# Patient Record
Sex: MALE | Race: WHITE | Employment: FULL TIME | URBAN - NONMETROPOLITAN AREA
[De-identification: names, ages, dates, MRNs, and addresses within clinical notes are randomized per-mention and may not be internally consistent; named-entity substitution may affect disease eponyms.]

---

## 2020-03-19 ENCOUNTER — OFFICE VISIT (OUTPATIENT)
Dept: URGENT CARE | Age: 45
End: 2020-03-19
Payer: COMMERCIAL

## 2020-03-19 VITALS
RESPIRATION RATE: 18 BRPM | OXYGEN SATURATION: 97 % | HEART RATE: 87 BPM | DIASTOLIC BLOOD PRESSURE: 91 MMHG | WEIGHT: 293 LBS | SYSTOLIC BLOOD PRESSURE: 130 MMHG | BODY MASS INDEX: 35.68 KG/M2 | HEIGHT: 76 IN | TEMPERATURE: 99.5 F

## 2020-03-19 LAB
INFLUENZA A ANTIBODY: NEGATIVE
INFLUENZA B ANTIBODY: NEGATIVE
S PYO AG THROAT QL: NORMAL

## 2020-03-19 PROCEDURE — 87804 INFLUENZA ASSAY W/OPTIC: CPT | Performed by: NURSE PRACTITIONER

## 2020-03-19 PROCEDURE — 87880 STREP A ASSAY W/OPTIC: CPT | Performed by: NURSE PRACTITIONER

## 2020-03-19 PROCEDURE — 99202 OFFICE O/P NEW SF 15 MIN: CPT | Performed by: NURSE PRACTITIONER

## 2020-03-19 RX ORDER — AMOXICILLIN 875 MG/1
875 TABLET, COATED ORAL 2 TIMES DAILY
Qty: 20 TABLET | Refills: 0 | Status: SHIPPED | OUTPATIENT
Start: 2020-03-19 | End: 2020-03-29

## 2020-03-19 ASSESSMENT — ENCOUNTER SYMPTOMS
SORE THROAT: 1
COUGH: 0

## 2020-03-19 NOTE — PROGRESS NOTES
[address] [facility]  [address] [address] [address]  [address] [place] [address]  Dept: [phone]  Loc: [phone]    Ashutosh Ren is a 38 y.o. male who presents today for his medical conditions/complaintsas noted below. Ashutosh Ren is c/o of Pharyngitis and Fever (low grade)        HPI:     Pharyngitis   This is a new problem. The current episode started yesterday. The problem has been gradually worsening. Associated symptoms include a fever and a sore throat. Pertinent negatives include no arthralgias, chills, congestion, coughing, headaches or myalgias. History reviewed. No pertinent past medical history. Past Surgical History:   Procedure Laterality Date    KNEE SURGERY Right 1993    PILONIDAL CYST DRAINAGE  1997       History reviewed. No pertinent family history. Social History     Tobacco Use    Smoking status: Former Smoker    Smokeless tobacco: Current User     Types: Snuff   Substance Use Topics    Alcohol use: Not on file      Current Outpatient Medications   Medication Sig Dispense Refill    amoxicillin (AMOXIL) 875 MG tablet Take 1 tablet by mouth 2 times daily for 10 days 20 tablet 0     No current facility-administered medications for this visit. Allergies   Allergen Reactions    Bee Venom     Other      Tree nuts       Health Maintenance   Topic Date Due    HIV screen  12/02/1989    DTaP/Tdap/Td vaccine (1 - Tdap) 12/02/1993    Lipid screen  12/02/2014    Diabetes screen  12/02/2014    Flu vaccine (1) 09/01/2019    Shingles Vaccine (1 of 2) 12/02/2024    Hepatitis A vaccine  Aged Out    Hepatitis B vaccine  Aged Out    Hib vaccine  Aged Out    Meningococcal (ACWY) vaccine  Aged Out    Pneumococcal 0-64 years Vaccine  Aged Out       Subjective:     Review of Systems   Constitutional: Positive for fever. Negative for chills. HENT: Positive for sore throat. Negative for congestion. Respiratory: Negative for cough. Musculoskeletal: Negative for arthralgias and myalgias. Neurological: Negative for headaches. Objective:     Physical Exam  Vitals signs and nursing note reviewed. Constitutional:       Appearance: Normal appearance. He is well-developed. HENT:      Head: Normocephalic and atraumatic. Right Ear: A middle ear effusion is present. Left Ear: A middle ear effusion is present. Mouth/Throat:      Mouth: Mucous membranes are moist.      Pharynx: Uvula midline. Pharyngeal swelling and posterior oropharyngeal erythema present. Tonsils: 1+ on the right. 1+ on the left. Eyes:      General: Lids are normal.      Conjunctiva/sclera: Conjunctivae normal.      Pupils: Pupils are equal, round, and reactive to light. Cardiovascular:      Rate and Rhythm: Normal rate and regular rhythm. Heart sounds: Normal heart sounds. Pulmonary:      Effort: Pulmonary effort is normal.      Breath sounds: Normal breath sounds. Lymphadenopathy:      Cervical: Cervical adenopathy present. Skin:     General: Skin is warm and dry. Neurological:      Mental Status: He is alert and oriented to person, place, and time. Psychiatric:         Speech: Speech normal.         Behavior: Behavior normal.       BP (!) 130/91   Pulse 87   Temp 99.5 °F (37.5 °C)   Resp 18   Ht 6' 4\" (1.93 m)   Wt 293 lb (132.9 kg)   SpO2 97%   BMI 35.67 kg/m²     Assessment:       Diagnosis Orders   1.  Fever, unspecified fever cause  POCT rapid strep A    POCT Influenza A/B   2. Pharyngitis, unspecified etiology         Plan:      Orders Placed This Encounter   Procedures    POCT rapid strep A    POCT Influenza A/B     Results for orders placed or performed in visit on 03/19/20   POCT rapid strep A   Result Value Ref Range    Strep A Ag None Detected None Detected   POCT Influenza A/B   Result Value Ref Range    Influenza A Ab negative     Influenza B Ab negative          Return if symptoms worsen or fail to naproxen (Aleve). Read and follow all instructions on the label. · Be careful when taking over-the-counter cold or flu medicines and Tylenol at the same time. Many of these medicines have acetaminophen, which is Tylenol. Read the labels to make sure that you are not taking more than the recommended dose. Too much acetaminophen (Tylenol) can be harmful. · Drink plenty of fluids. Fluids may help soothe an irritated throat. Hot fluids, such as tea or soup, may help decrease throat pain. · Use over-the-counter throat lozenges to soothe pain. Regular cough drops or hard candy may also help. These should not be given to young children because of the risk of choking. · Do not smoke or allow others to smoke around you. If you need help quitting, talk to your doctor about stop-smoking programs and medicines. These can increase your chances of quitting for good. · Use a vaporizer or humidifier to add moisture to your bedroom. Follow the directions for cleaning the machine. When should you call for help? Call your doctor now or seek immediate medical care if:    · You have new or worse trouble swallowing.     · Your sore throat gets much worse on one side.    Watch closely for changes in your health, and be sure to contact your doctor if you do not get better as expected. Where can you learn more? Go to https://Dropmysitepenelsyeb.Enu[employer]ia [employer]icon[employer]. org and sign in to your iCabbi account. Enter A554 in the Security Innovation box to learn more about \"Sore Throat: Care Instructions. \"     If you do not have an account, please click on the \"Sign Up Now\" link. Current as of: July 28, 2019Content Version: 12.4  © 7520-3766 Healthwise, Incorporated. Care instructions adapted under license by [facility] ([facility]). If you have questions about a medical condition or this instruction, always ask your healthcare professional. [place] [address] any warranty or liability for your use of this information.          Patient Education        Learning About Fever  What is a fever? A fever is a high body temperature. It's one way your body fights being sick. A fever shows that the body is responding to infection or other illnesses, both minor and severe. A fever is a symptom, not an illness by itself. A fever can be a sign that you are ill, but most fevers are not caused by a serious problem. You may have a fever with a minor illness, such as a cold. But sometimes a very serious infection may cause little or no fever. It is important to look at other symptoms, other conditions you have, and how you feel in general. In children, notice how they act and see what symptoms they complain of. What is a normal body temperature? A normal body temperature is about 98. 6ºF. Some people have a normal temperature that is a little higher or a little lower than this. Your temperature may be a little lower in the morning than it is later in the day. It may go up during hot weather or when you exercise, wear heavy clothes, or take a hot bath. Your temperature may also be different depending on how you take it. A temperature taken in the mouth (oral) or under the arm may be a little lower than your core temperature (rectal). What is a fever temperature? A core temperature of 100.4°F or above is considered a fever. What can cause a fever? A fever may be caused by:  · Infections. This is the most common cause of a fever. Examples of infections that can cause a fever include the flu, a kidney infection, or pneumonia. · Some medicines. · Severe trauma or injury, such as a heart attack, stroke, heatstroke, or burns. · Other medical conditions, such as arthritis and some cancers. How can you treat a fever at home? · Ask your doctor if you can take an over-the-counter pain medicine, such as acetaminophen (Tylenol), ibuprofen (Advil, Motrin), or naproxen (Aleve). Be safe with medicines. Read and follow all instructions on the label.   · To prevent

## 2020-03-19 NOTE — PATIENT INSTRUCTIONS
Patient Education        Sore Throat: Care Instructions  Your Care Instructions    Infection by bacteria or a virus causes most sore throats. Cigarette smoke, dry air, air pollution, allergies, and yelling can also cause a sore throat. Sore throats can be painful and annoying. Fortunately, most sore throats go away on their own. If you have a bacterial infection, your doctor may prescribe antibiotics. Follow-up care is a key part of your treatment and safety. Be sure to make and go to all appointments, and call your doctor if you are having problems. It's also a good idea to know your test results and keep a list of the medicines you take. How can you care for yourself at home? · If your doctor prescribed antibiotics, take them as directed. Do not stop taking them just because you feel better. You need to take the full course of antibiotics. · Gargle with warm salt water once an hour to help reduce swelling and relieve discomfort. Use 1 teaspoon of salt mixed in 1 cup of warm water. · Take an over-the-counter pain medicine, such as acetaminophen (Tylenol), ibuprofen (Advil, Motrin), or naproxen (Aleve). Read and follow all instructions on the label. · Be careful when taking over-the-counter cold or flu medicines and Tylenol at the same time. Many of these medicines have acetaminophen, which is Tylenol. Read the labels to make sure that you are not taking more than the recommended dose. Too much acetaminophen (Tylenol) can be harmful. · Drink plenty of fluids. Fluids may help soothe an irritated throat. Hot fluids, such as tea or soup, may help decrease throat pain. · Use over-the-counter throat lozenges to soothe pain. Regular cough drops or hard candy may also help. These should not be given to young children because of the risk of choking. · Do not smoke or allow others to smoke around you. If you need help quitting, talk to your doctor about stop-smoking programs and medicines.  These can increase your this.  Your temperature may be a little lower in the morning than it is later in the day. It may go up during hot weather or when you exercise, wear heavy clothes, or take a hot bath. Your temperature may also be different depending on how you take it. A temperature taken in the mouth (oral) or under the arm may be a little lower than your core temperature (rectal). What is a fever temperature? A core temperature of 100.4°F or above is considered a fever. What can cause a fever? A fever may be caused by:  · Infections. This is the most common cause of a fever. Examples of infections that can cause a fever include the flu, a kidney infection, or pneumonia. · Some medicines. · Severe trauma or injury, such as a heart attack, stroke, heatstroke, or burns. · Other medical conditions, such as arthritis and some cancers. How can you treat a fever at home? · Ask your doctor if you can take an over-the-counter pain medicine, such as acetaminophen (Tylenol), ibuprofen (Advil, Motrin), or naproxen (Aleve). Be safe with medicines. Read and follow all instructions on the label. · To prevent dehydration, drink plenty of fluids. Choose water and other caffeine-free clear liquids until you feel better. If you have kidney, heart, or liver disease and have to limit fluids, talk with your doctor before you increase the amount of fluids you drink. Follow-up care is a key part of your treatment and safety. Be sure to make and go to all appointments, and call your doctor if you are having problems. It's also a good idea to know your test results and keep a list of the medicines you take. Where can you learn more? Go to https://[employer] (PTG)sannaeweb.TransEnergy. org and sign in to your hovelstay account. Enter M325 in the Zimbra box to learn more about \"Learning About Fever. \"     If you do not have an account, please click on the \"Sign Up Now\" link.   Current as of: June 26, 2019Content Version: 12.4  © 7052-6278

## 2020-05-26 ENCOUNTER — OFFICE VISIT (OUTPATIENT)
Age: 45
End: 2020-05-26
Payer: COMMERCIAL

## 2020-05-26 VITALS
TEMPERATURE: 98.3 F | OXYGEN SATURATION: 97 % | BODY MASS INDEX: 37.14 KG/M2 | SYSTOLIC BLOOD PRESSURE: 128 MMHG | HEIGHT: 76 IN | WEIGHT: 305 LBS | HEART RATE: 77 BPM | DIASTOLIC BLOOD PRESSURE: 86 MMHG

## 2020-05-26 PROCEDURE — 99213 OFFICE O/P EST LOW 20 MIN: CPT | Performed by: NURSE PRACTITIONER

## 2020-05-26 RX ORDER — ONDANSETRON 4 MG/1
4 TABLET, ORALLY DISINTEGRATING ORAL 3 TIMES DAILY PRN
Qty: 15 TABLET | Refills: 0 | Status: SHIPPED | OUTPATIENT
Start: 2020-05-26 | End: 2020-05-31

## 2020-05-26 ASSESSMENT — ENCOUNTER SYMPTOMS
ABDOMINAL PAIN: 1
SHORTNESS OF BREATH: 0
DIARRHEA: 1
NAUSEA: 1
VOMITING: 0
SINUS PRESSURE: 0
EYES NEGATIVE: 1
SORE THROAT: 0
COUGH: 0

## 2020-05-26 ASSESSMENT — VISUAL ACUITY: OU: 1

## 2020-05-26 NOTE — PROGRESS NOTES
[address]  [address] [address]  Dept: [phone]  Dept Fax: [phone]  Loc: [phone]    Joey Sanchez is here with complaint of nausea, upset stomach with diarrhea, chills and headache since Sunday night. He has been taking OTC Tylenol for headache but nothing for his stomach. 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 He initially thought it was something he ate but no one in his household has been sick and they all ate the same food over the weekend. He has some chronic diarrhea at times since having his gallbladder out but says this is much different. He denies fever, earache or sore throat. He is not eating at all, but is trying to drink gatorade and is voiding well. HPI:     HPI    History reviewed. No pertinent past medical history. Past Surgical History:   Procedure Laterality Date    KNEE SURGERY Right 1993    PILONIDAL CYST DRAINAGE  1997       History reviewed. No pertinent family history. Social History     Tobacco Use    Smoking status: Former Smoker    Smokeless tobacco: Current User     Types: Snuff   Substance Use Topics    Alcohol use: Not on file      Current Outpatient Medications   Medication Sig Dispense Refill    ondansetron (ZOFRAN-ODT) 4 MG disintegrating tablet Take 1 tablet by mouth 3 times daily as needed for Nausea or Vomiting 15 tablet 0     No current facility-administered medications for this visit.       Allergies   Allergen Reactions    Bee Venom     Other      Tree nuts       Health Maintenance   Topic Date Due    HIV screen  12/02/1989    DTaP/Tdap/Td vaccine (1 - Tdap) 12/02/1993    Lipid screen  12/02/2014    Diabetes screen  12/02/2014    Flu vaccine (Season Ended) 09/01/2020    Hepatitis A vaccine  Aged Out    Hepatitis B vaccine  Aged Out    Hib vaccine  Aged Out    Meningococcal (ACWY) vaccine  Aged Out    Pneumococcal 0-64 sounds are normal.      Palpations: Abdomen is soft. Tenderness: There is abdominal tenderness in the left lower quadrant. There is no right CVA tenderness, left CVA tenderness, guarding or rebound. Musculoskeletal:         General: No tenderness or deformity. Lymphadenopathy:      Head:      Right side of head: No tonsillar adenopathy. Left side of head: No tonsillar adenopathy. Skin:     General: Skin is warm and dry. Capillary Refill: Capillary refill takes less than 2 seconds. Neurological:      General: No focal deficit present. Mental Status: He is alert, oriented to person, place, and time and easily aroused. Mental status is at baseline. Psychiatric:         Attention and Perception: Attention normal.         Mood and Affect: Mood normal.         Speech: Speech normal.         Behavior: Behavior normal. Behavior is cooperative. /86   Pulse 77   Temp 98.3 °F (36.8 °C)   Ht 6' 4\" (1.93 m)   Wt (!) 305 lb (138.3 kg)   SpO2 97%   BMI 37.13 kg/m²     :Assessment       Diagnosis Orders   1. Diarrhea, unspecified type  COVID-19   2. Chills  COVID-19   3. Nonintractable headache, unspecified chronicity pattern, unspecified headache type  COVID-19   4. Nausea  COVID-19       :Plan      Orders Placed This Encounter   Procedures    COVID-19     Gravity     Scheduling Instructions:      1) Due to current limited availability of the COVID-19 test, tests will be prioritized based on responses to questions above. Testing may be delayed due to volume. 2) Print and instruct patient to adhere to [facility] home isolation program. (Link Above)              3) Set up or refer patient for a monitoring program.              4) Have patient sign up for and leverage MyChart (if not previously done).      Plenty of fluids, bland diet  Rest  OTC Tylenol as needed   Zofran as directed for nausea  Work note for 14 days  Follow up with PCP or return to Flu clinic for worsening or unresolved symptoms. Severe abd pain, inability to keep down food or fluids pt is advised to ER    Since pt is being tested for COVID pt has been instructed to quarantine from contacts until testing has been resulted. Further instructions will follow, as of now, this is 14 days unless otherwise specified when results are back. If SOB or worsening sx's develop, need to go to ED or return to clinic, pt voiced understanding. Pt was given printed instructions today on Possible COVID-19 infection with self-quarantine and management of symptoms    Call or return to clinic prn if these symptoms worsen or fail to improve as anticipated. Return if symptoms worsen or fail to improve. Orders Placed This Encounter   Medications    ondansetron (ZOFRAN-ODT) 4 MG disintegrating tablet     Sig: Take 1 tablet by mouth 3 times daily as needed for Nausea or Vomiting     Dispense:  15 tablet     Refill:  0        Patient Instructions   Plenty of fluids, bland diet  Rest  OTC Tylenol as needed   Zofran as directed for nausea  Work note for 14 days  Follow up with PCP or return to Flu clinic for worsening or unresolved symptoms. Severe abd pain, inability to keep down food or fluids pt is advised to ER           Patient given educational materials- see patient instructions. Discussed use, benefit, and side effects of prescribedmedications. All patient questions answered. Pt voiced understanding.        Electronically signed by JULIETH Nielsen CNP on 5/26/2020 at 1:04 PM

## 2020-05-26 NOTE — PATIENT INSTRUCTIONS
Plenty of fluids, bland diet  Rest  OTC Tylenol as needed   Zofran as directed for nausea  Work note for 14 days  Follow up with PCP or return to Flu clinic for worsening or unresolved symptoms.  Severe abd pain, inability to keep down food or fluids pt is advised to ER

## 2020-05-27 LAB
REPORT: NORMAL
SARS-COV-2: NOT DETECTED
THIS TEST SENT TO: NORMAL

## 2020-11-16 ENCOUNTER — TELEPHONE (OUTPATIENT)
Dept: GASTROENTEROLOGY | Age: 45
End: 2020-11-16

## 2020-11-16 RX ORDER — CHLORDIAZEPOXIDE HYDROCHLORIDE AND CLIDINIUM BROMIDE 5; 2.5 MG/1; MG/1
1 CAPSULE ORAL 3 TIMES DAILY PRN
COMMUNITY
End: 2020-11-18 | Stop reason: ALTCHOICE

## 2020-11-16 RX ORDER — ONDANSETRON 4 MG/1
4 TABLET, ORALLY DISINTEGRATING ORAL 2 TIMES DAILY PRN
COMMUNITY
End: 2020-11-18 | Stop reason: ALTCHOICE

## 2020-11-16 NOTE — TELEPHONE ENCOUNTER
11-16-20 Called patient asked if he had any recent procedures that indicated thickening in the throat. Patient stated that maybe like in 2013 he had a EGD & colonoscopy done that showed it. Requested that patient contact that Dr and have them fax over reports. Verbal understanding per patient.  Cv

## 2020-11-18 ENCOUNTER — OFFICE VISIT (OUTPATIENT)
Dept: PRIMARY CARE CLINIC | Age: 45
End: 2020-11-18
Payer: COMMERCIAL

## 2020-11-18 VITALS
TEMPERATURE: 97.9 F | DIASTOLIC BLOOD PRESSURE: 76 MMHG | OXYGEN SATURATION: 98 % | BODY MASS INDEX: 37.26 KG/M2 | HEIGHT: 76 IN | SYSTOLIC BLOOD PRESSURE: 124 MMHG | WEIGHT: 306 LBS | HEART RATE: 86 BPM

## 2020-11-18 DIAGNOSIS — Z76.89 ENCOUNTER TO ESTABLISH CARE: ICD-10-CM

## 2020-11-18 LAB
ALBUMIN SERPL-MCNC: 4.8 G/DL (ref 3.5–5.2)
ALP BLD-CCNC: 87 U/L (ref 40–130)
ALT SERPL-CCNC: 39 U/L (ref 5–41)
ANION GAP SERPL CALCULATED.3IONS-SCNC: 8 MMOL/L (ref 7–19)
AST SERPL-CCNC: 20 U/L (ref 5–40)
BASOPHILS ABSOLUTE: 0.1 K/UL (ref 0–0.2)
BASOPHILS RELATIVE PERCENT: 0.6 % (ref 0–1)
BILIRUB SERPL-MCNC: 0.7 MG/DL (ref 0.2–1.2)
BUN BLDV-MCNC: 15 MG/DL (ref 6–20)
CALCIUM SERPL-MCNC: 9.2 MG/DL (ref 8.6–10)
CHLORIDE BLD-SCNC: 106 MMOL/L (ref 98–111)
CHOLESTEROL, TOTAL: 211 MG/DL (ref 160–199)
CO2: 26 MMOL/L (ref 22–29)
CREAT SERPL-MCNC: 0.9 MG/DL (ref 0.5–1.2)
EOSINOPHILS ABSOLUTE: 0.2 K/UL (ref 0–0.6)
EOSINOPHILS RELATIVE PERCENT: 1.8 % (ref 0–5)
GFR AFRICAN AMERICAN: >59
GFR NON-AFRICAN AMERICAN: >60
GLUCOSE BLD-MCNC: 86 MG/DL (ref 74–109)
HBA1C MFR BLD: 5.5 % (ref 4–6)
HCT VFR BLD CALC: 45.3 % (ref 42–52)
HDLC SERPL-MCNC: 47 MG/DL (ref 55–121)
HEMOGLOBIN: 14.8 G/DL (ref 14–18)
IMMATURE GRANULOCYTES #: 0 K/UL
LDL CHOLESTEROL CALCULATED: 146 MG/DL
LYMPHOCYTES ABSOLUTE: 2.4 K/UL (ref 1.1–4.5)
LYMPHOCYTES RELATIVE PERCENT: 28.1 % (ref 20–40)
MCH RBC QN AUTO: 28.7 PG (ref 27–31)
MCHC RBC AUTO-ENTMCNC: 32.7 G/DL (ref 33–37)
MCV RBC AUTO: 88 FL (ref 80–94)
MONOCYTES ABSOLUTE: 0.5 K/UL (ref 0–0.9)
MONOCYTES RELATIVE PERCENT: 5.5 % (ref 0–10)
NEUTROPHILS ABSOLUTE: 5.4 K/UL (ref 1.5–7.5)
NEUTROPHILS RELATIVE PERCENT: 63.6 % (ref 50–65)
PDW BLD-RTO: 12.9 % (ref 11.5–14.5)
PLATELET # BLD: 231 K/UL (ref 130–400)
PMV BLD AUTO: 9.9 FL (ref 9.4–12.4)
POTASSIUM SERPL-SCNC: 4.4 MMOL/L (ref 3.5–5)
RBC # BLD: 5.15 M/UL (ref 4.7–6.1)
SODIUM BLD-SCNC: 140 MMOL/L (ref 136–145)
TOTAL PROTEIN: 7.4 G/DL (ref 6.6–8.7)
TRIGL SERPL-MCNC: 89 MG/DL (ref 0–149)
TSH SERPL DL<=0.05 MIU/L-ACNC: 0.87 UIU/ML (ref 0.27–4.2)
WBC # BLD: 8.5 K/UL (ref 4.8–10.8)

## 2020-11-18 PROCEDURE — 99214 OFFICE O/P EST MOD 30 MIN: CPT | Performed by: NURSE PRACTITIONER

## 2020-11-18 ASSESSMENT — PATIENT HEALTH QUESTIONNAIRE - PHQ9
SUM OF ALL RESPONSES TO PHQ9 QUESTIONS 1 & 2: 0
SUM OF ALL RESPONSES TO PHQ QUESTIONS 1-9: 0
1. LITTLE INTEREST OR PLEASURE IN DOING THINGS: 0
SUM OF ALL RESPONSES TO PHQ QUESTIONS 1-9: 0
2. FEELING DOWN, DEPRESSED OR HOPELESS: 0
SUM OF ALL RESPONSES TO PHQ QUESTIONS 1-9: 0

## 2020-11-18 ASSESSMENT — ENCOUNTER SYMPTOMS
VOICE CHANGE: 0
SHORTNESS OF BREATH: 0
BACK PAIN: 0
VOMITING: 0
NAUSEA: 0
RHINORRHEA: 0
COUGH: 0
COLOR CHANGE: 0
PHOTOPHOBIA: 0

## 2020-11-18 NOTE — PROGRESS NOTES
[address] [facility]  [address] [address]  [address] [address]  Dept: [phone]  Dept Fax: [phone]  Loc: [phone]    Dina Alba is a 38 y.o. male who presents today for his medical conditions/complaints as noted below. Dina Alba is c/o of Established New Doctor and Referral - General (REF FOR THICKENING IN HIS THROAT; ESOPHAGEAL PAIN; BCBS; AA)        HPI:     HPI   Chief Complaint   Patient presents with   [facility] Established New Doctor    Referral - General     REF FOR THICKENING IN HIS THROAT; ESOPHAGEAL PAIN; BCBS; AA     Patient presents today to establish care. He has history of GERD; he has increasing esophageal pain. He feels \"like there is thickening in his esophagus\". He is scheduled to see GI on 11/23/2020. He needs an actual referral today for this. He is not currently on a PPI. He reports history of EGD which showed \"erosion\" and has also had colonoscopy with polypectomy. He states since having his gallbladder out in 2015 he has had dumping syndrome and increasing GERD. He does not want to take PPI because he does not feel \"acid reflux\". Patient reports snoring; he typically sleeps only 4-5 hours per night because he \"just wakes up\". He has never had sleep study before. He is obese with large neck girth.      Past Medical History:   Diagnosis Date    Esophageal erosions     Gastritis     GERD (gastroesophageal reflux disease)     with Esophagitis    Irritable bowel syndrome with diarrhea       Past Surgical History:   Procedure Laterality Date    CHOLECYSTECTOMY  12/01/2013    COLONOSCOPY  02/11/2016    [facility]    KNEE SURGERY Right 1993    PILONIDAL CYST DRAINAGE  1997    UPPER GASTROINTESTINAL ENDOSCOPY  02/11/2016    [facility]: Erosion w/o bleeding, LA Grade A reflux       Vitals 11/18/2020 5/26/2020 3/19/2020 4/43/4442   SYSTOLIC [phone] [id number]   DIASTOLIC 76 86 91 91   Pulse 86 77 - 87   Temp 97.9 98.3 - 99.5   Resp - - - 18   SpO2 98 97 - 97   Weight 306 lb 305 lb - 293 lb   Height 6' 4\" 6' 4\" - 6' 4\"   Body mass index 37.24 kg/m2 37.12 kg/m2 - 35.66 kg/m2       Family History   Problem Relation Age of Onset    Cancer Mother         unknown    Heart Attack Father        Social History     Tobacco Use    Smoking status: Former Smoker     Packs/day: 1.00     Types: Cigarettes     Last attempt to quit: 2020     Years since quittin.8    Smokeless tobacco: Current User     Types: Snuff   Substance Use Topics    Alcohol use: Yes     Comment: rarely      No current outpatient medications on file. No current facility-administered medications for this visit. Allergies   Allergen Reactions    Bee Venom     Other      Tree nuts       Health Maintenance   Topic Date Due    Pneumococcal 0-64 years Vaccine (1 of 1 - PPSV23) 1980    HIV screen  1989    DTaP/Tdap/Td vaccine (1 - Tdap) 1993    Lipid screen  2014    Diabetes screen  2014    Flu vaccine  Completed    Hepatitis A vaccine  Aged Out    Hepatitis B vaccine  Aged Out    Hib vaccine  Aged Out    Meningococcal (ACWY) vaccine  Aged Out       Subjective:      Review of Systems   Constitutional: Negative for chills and fever. HENT: Negative for ear pain, hearing loss, rhinorrhea and voice change. Eyes: Negative for photophobia and visual disturbance. Respiratory: Negative for cough and shortness of breath. Cardiovascular: Negative for chest pain and palpitations. Gastrointestinal: Negative for nausea and vomiting. Endocrine: Negative. Negative for cold intolerance and heat intolerance. Genitourinary: Negative for difficulty urinating and flank pain. Musculoskeletal: Negative for back pain and neck pain. Skin: Negative for color change and rash. Allergic/Immunologic: Negative for environmental allergies and food allergies.    Neurological: Negative for dizziness, speech difficulty and headaches. Hematological: Does not bruise/bleed easily. Psychiatric/Behavioral: Negative for sleep disturbance and suicidal ideas. Objective:     Physical Exam  Vitals signs and nursing note reviewed. Constitutional:       Appearance: He is well-developed. HENT:      Head: Atraumatic. Right Ear: External ear normal.      Left Ear: External ear normal.      Nose: Nose normal.      Mouth/Throat:     Eyes:      Conjunctiva/sclera: Conjunctivae normal.      Pupils: Pupils are equal, round, and reactive to light. Neck:      Musculoskeletal: Normal range of motion and neck supple. Cardiovascular:      Rate and Rhythm: Normal rate and regular rhythm. Heart sounds: Normal heart sounds, S1 normal and S2 normal.   Pulmonary:      Effort: Pulmonary effort is normal.      Breath sounds: Normal breath sounds. Abdominal:      General: Bowel sounds are normal.      Palpations: Abdomen is soft. Musculoskeletal: Normal range of motion. Skin:     General: Skin is warm and dry. Neurological:      Mental Status: He is alert and oriented to person, place, and time. Psychiatric:         Behavior: Behavior normal.       /76   Pulse 86   Temp 97.9 °F (36.6 °C) (Temporal)   Ht 6' 4\" (1.93 m)   Wt (!) 306 lb (138.8 kg)   SpO2 98%   BMI 37.25 kg/m²     Assessment:       Diagnosis Orders   1. Encounter to establish care  [address], Gastroenterology, [place]    CBC Auto Differential    Comprehensive Metabolic Panel    Lipid Panel    Hemoglobin A1C    TSH without Reflex   2. Gastroesophageal reflux disease without esophagitis  JULIETH Haskins, Gastroenterology, [place]         Plan:     Patient will keep GI appointment as scheduled next week. Discussed that he should consider a sleep study for likely MARLEEN which could be contributing to his symptoms as well. He is agreeable to this but will see GI first. I have placed orders for labs today.  He will follow-up in

## 2020-11-23 ENCOUNTER — OFFICE VISIT (OUTPATIENT)
Dept: GASTROENTEROLOGY | Age: 45
End: 2020-11-23
Payer: COMMERCIAL

## 2020-11-23 ENCOUNTER — TELEPHONE (OUTPATIENT)
Dept: PRIMARY CARE CLINIC | Age: 45
End: 2020-11-23

## 2020-11-23 VITALS
DIASTOLIC BLOOD PRESSURE: 76 MMHG | WEIGHT: 304 LBS | BODY MASS INDEX: 37.02 KG/M2 | OXYGEN SATURATION: 99 % | HEIGHT: 76 IN | HEART RATE: 72 BPM | SYSTOLIC BLOOD PRESSURE: 134 MMHG

## 2020-11-23 PROCEDURE — 99214 OFFICE O/P EST MOD 30 MIN: CPT | Performed by: NURSE PRACTITIONER

## 2020-11-23 RX ORDER — ATORVASTATIN CALCIUM 40 MG/1
40 TABLET, FILM COATED ORAL DAILY
Qty: 90 TABLET | Refills: 2 | Status: SHIPPED | OUTPATIENT
Start: 2020-11-23 | End: 2021-12-28 | Stop reason: SDUPTHER

## 2020-11-23 RX ORDER — OMEPRAZOLE 40 MG/1
40 CAPSULE, DELAYED RELEASE ORAL DAILY
Qty: 30 CAPSULE | Refills: 2 | Status: SHIPPED | OUTPATIENT
Start: 2020-11-23 | End: 2021-05-03

## 2020-11-23 ASSESSMENT — ENCOUNTER SYMPTOMS
VOMITING: 0
CONSTIPATION: 0
ABDOMINAL PAIN: 0
CHOKING: 0
ABDOMINAL DISTENTION: 0
BLOOD IN STOOL: 0
TROUBLE SWALLOWING: 0
RECTAL PAIN: 0
NAUSEA: 0
DIARRHEA: 0
COUGH: 0
SHORTNESS OF BREATH: 0
ANAL BLEEDING: 0

## 2020-11-23 NOTE — PATIENT INSTRUCTIONS
specific directions regarding restrictions to diet and bowel prep instructions including laxatives. Please read these instructions one week prior to your scheduled procedure to ensure that you are prepared. If you have any questions regarding these instructions please call our office Mon through Fri from 8:00 am to 4:00 pm.     Follow prep instructions provided for bowel prep. Take all of the bowel prep as directed. If you are having problems with nausea, stop your prep for 30-45 min to allow the nausea to subside before resuming your prep. It is important to drink plenty of fluids throughout the day before taking your laxatives. This will help to protect your kidneys, prevent dehydration and maximize the effect of the bowel prep. Your diet before a colonoscopy bowel preparation is very important to ensure a successful colon exam. It is recommended to consider certain changes to your diet three to four days prior to the procedure. Remember that your bowels need to be completely empty for the exam.    What foods are good to eat? Cut down on heavy solid foods three to four days before the procedure and start introducing lighter meals to your diet. The following food suggestions are a good part of your diet before a colonoscopy bowel preparation.  Light meat that is easily digestible such as chicken (without the skin)    Potatoes without skin    Cheese    Eggs    A light meal of steamed white fish    Light clear soups    Foods and drinks to avoid  Avoid foods that contain too much fiber. Stay clear of dark colored beverages. They can stick to the walls of the digestive tract and make it difficult to differentiate from blood.  Some of these foods are:   Red meat, rice, nuts and vegetables    Milk, other milk based fluids and cream    Most fruit and puddings    Whole grain pasta    Cereals, bran and seeds    Colored beverages, especially those that are red or purple in color    Red colored Jell-O   On the day before the colonoscopy, continue to drink plenty of clear fluids. It is important   to keep yourself hydrated before the exam.     Please follow all instructions as provided for cleansing the bowel. Failure to have an adequately prepped colon may cause you to have incomplete exam with further testing required.      [email or web address]

## 2020-11-23 NOTE — PROGRESS NOTES
Subjective:     Patient ID: Kym Sims is a 38 y.o. male  PCP: JULIETH Harding  Referring Provider: JULIETH Haskins - NP    HPI  Patient presents to the office today with the following complaints: Gastroesophageal Reflux      GERD  Paitent complains of heartburn/reflux. Symptoms have been present for 6 months. Symptoms occur daily and have worsened. This has been associated with sensation of \"thickening in esophagus/throat. \"  He denies heartburn, hematemesis, melena and midespigastric pain. Medical therapy in the past has included antacids. He is using TUMS prn. Patient denies any lower GI symptoms such as constipation, diarrhea, change in bowel habits, rectal bleeding, and rectal pain. Last EGD 2016 - erosion with bleeding  Last Colonoscopy 2016 - polyps  Denies any family history of colon cancer or colon polyps    This was my first time assessing Mr. Benita Tucker:     1. Globus sensation    2. Gastroesophageal reflux disease, unspecified whether esophagitis present    3. Hx of colonic polyps            Plan:   - Omeprazole 40 mg po daily  - Follow up 6-8 weeks or sooner if needed  - Schedule colonoscopy and endoscopy  Instruct on bowel prep. Nothing to eat or drink after midnight the day of the exam.  Unable to drive for 24 hours after the procedure. No aspirin or nonsteroidal anti-inflammatories for 5 days before procedure. I have discussed the benefits, alternatives, and risks (including bleeding, perforation and death)  for pursuing Endoscopy (EGD/Colonscopy/EUS/ERCP) with the patient and they are willing to continue. We also discussed the need for anesthesia, IV access, proper dietary changes, medication changes if necessary, and need for bowel prep (if ordered) prior to their Endoscopic procedure. They are aware they must have someone accompany them to their scheduled procedure to drive them home - they agree to the above and are willing to continue.        Orders  No orders of the defined types were placed in this encounter. Medications  Orders Placed This Encounter   Medications    omeprazole (PRILOSEC) 40 MG delayed release capsule     Sig: Take 1 capsule by mouth daily Take first thing daily on an empty stomach.      Dispense:  30 capsule     Refill:  2         Patient History:     Past Medical History:   Diagnosis Date    Esophageal erosions     Gastritis     GERD (gastroesophageal reflux disease)     with Esophagitis    Irritable bowel syndrome with diarrhea        Past Surgical History:   Procedure Laterality Date    CHOLECYSTECTOMY  2013    COLONOSCOPY  2016    [facility]  [place]    KNEE SURGERY Right 1993    PILONIDAL CYST DRAINAGE      UPPER GASTROINTESTINAL ENDOSCOPY  2016    [facility]: Erosion w/o bleeding, [place] Grade A reflux       Family History   Problem Relation Age of Onset    Cancer Mother         unknown    Heart Attack Father     Colon Cancer Neg Hx     Esophageal Cancer Neg Hx     Liver Cancer Neg Hx     Rectal Cancer Neg Hx     Stomach Cancer Neg Hx     Liver Disease Neg Hx     Colon Polyps Neg Hx        Social History     Socioeconomic History    Marital status:      Spouse name: None    Number of children: None    Years of education: None    Highest education level: None   Occupational History    None   Social Needs    Financial resource strain: None    Food insecurity     Worry: None     Inability: None    Transportation needs     Medical: None     Non-medical: None   Tobacco Use    Smoking status: Former Smoker     Packs/day: 0.00     Types: Cigarettes     Last attempt to quit: 2000     Years since quittin.9    Smokeless tobacco: Current User     Types: Snuff   Substance and Sexual Activity    Alcohol use: Yes     Comment: rarely    Drug use: Never    Sexual activity: None   Lifestyle    Physical activity     Days per week: None     Minutes per session: None    Stress: None   Relationships    Social connections     Talks on phone: None     Gets together: None     Attends Oriental orthodox service: None     Active member of club or organization: None     Attends meetings of clubs or organizations: None     Relationship status: None    Intimate partner violence     Fear of current or ex partner: None     Emotionally abused: None     Physically abused: None     Forced sexual activity: None   Other Topics Concern    None   Social History Narrative    None       Current Outpatient Medications   Medication Sig Dispense Refill    omeprazole (PRILOSEC) 40 MG delayed release capsule Take 1 capsule by mouth daily Take first thing daily on an empty stomach. 30 capsule 2     No current facility-administered medications for this visit. Allergies   Allergen Reactions    Bee Venom     Other      Tree nuts       Review of Systems   Constitutional: Negative for activity change, appetite change, fatigue, fever and unexpected weight change. HENT: Negative for trouble swallowing. Respiratory: Negative for cough, choking and shortness of breath. Cardiovascular: Negative for chest pain. Gastrointestinal: Negative for abdominal distention, abdominal pain, anal bleeding, blood in stool, constipation, diarrhea, nausea, rectal pain and vomiting. \"thickening in throat\"   Allergic/Immunologic: Negative for food allergies. All other systems reviewed and are negative. Objective:     /76   Pulse 72   Ht 6' 4\" (1.93 m)   Wt (!) 304 lb (137.9 kg)   SpO2 99%   BMI 37.00 kg/m²     Physical Exam  Vitals signs reviewed. Constitutional:       General: He is not in acute distress. Appearance: He is well-developed. HENT:      Head: Normocephalic and atraumatic.       Right Ear: External ear normal.      Left Ear: External ear normal.      Nose: Nose normal.      Comments: Mask on     Mouth/Throat:      Comments: Mask on  Eyes:      Conjunctiva/sclera: Conjunctivae normal.      Pupils: Pupils are equal, round, and reactive to light. Neck:      Musculoskeletal: Normal range of motion and neck supple. Cardiovascular:      Rate and Rhythm: Normal rate and regular rhythm. Heart sounds: Normal heart sounds. No murmur. No friction rub. No gallop. Pulmonary:      Effort: Pulmonary effort is normal. No respiratory distress. Breath sounds: Normal breath sounds. Abdominal:      General: Bowel sounds are normal. There is no distension. Palpations: Abdomen is soft. There is no mass. Tenderness: There is no abdominal tenderness. There is no guarding or rebound. Musculoskeletal: Normal range of motion. Skin:     General: Skin is warm and dry. Findings: No rash. Nails: There is no clubbing. Neurological:      Mental Status: He is alert and oriented to person, place, and time. Gait: Gait normal.   Psychiatric:         Behavior: Behavior normal.         Thought Content:  Thought content normal.

## 2020-11-23 NOTE — TELEPHONE ENCOUNTER
Attempted to call patient with results, no answer. Left message for a call back. Rx for Atorvastatin sent to pharmacy. Lab orders placed.

## 2020-11-23 NOTE — TELEPHONE ENCOUNTER
----- Message from JULIETH Chang sent at 11/18/2020  3:21 PM CST -----  Please inform patient that his bad cholesterol (LDL) is elevated. I would like him to begin atorvastatin 40 mg daily. Repeat labs in 6 months.

## 2020-12-06 ENCOUNTER — OFFICE VISIT (OUTPATIENT)
Age: 45
End: 2020-12-06

## 2020-12-06 VITALS — OXYGEN SATURATION: 97 % | TEMPERATURE: 98.1 F | HEART RATE: 82 BPM

## 2020-12-06 LAB — SARS-COV-2, PCR: NOT DETECTED

## 2020-12-10 ENCOUNTER — ANESTHESIA (OUTPATIENT)
Dept: OPERATING ROOM | Age: 45
End: 2020-12-10

## 2020-12-10 ENCOUNTER — APPOINTMENT (OUTPATIENT)
Dept: OPERATING ROOM | Age: 45
End: 2020-12-10

## 2020-12-10 ENCOUNTER — HOSPITAL ENCOUNTER (OUTPATIENT)
Age: 45
Setting detail: SPECIMEN
Discharge: HOME | End: 2020-12-10
Payer: COMMERCIAL

## 2020-12-10 ENCOUNTER — ANESTHESIA EVENT (OUTPATIENT)
Dept: OPERATING ROOM | Age: 45
End: 2020-12-10

## 2020-12-10 ENCOUNTER — HOSPITAL ENCOUNTER (OUTPATIENT)
Age: 45
Setting detail: OUTPATIENT SURGERY
Discharge: HOME | End: 2020-12-10
Attending: INTERNAL MEDICINE | Admitting: INTERNAL MEDICINE
Payer: COMMERCIAL

## 2020-12-10 VITALS — OXYGEN SATURATION: 97 % | SYSTOLIC BLOOD PRESSURE: 110 MMHG | DIASTOLIC BLOOD PRESSURE: 69 MMHG

## 2020-12-10 VITALS
WEIGHT: 310 LBS | HEIGHT: 76 IN | TEMPERATURE: 98.7 F | SYSTOLIC BLOOD PRESSURE: 120 MMHG | RESPIRATION RATE: 20 BRPM | DIASTOLIC BLOOD PRESSURE: 69 MMHG | BODY MASS INDEX: 37.75 KG/M2 | OXYGEN SATURATION: 98 % | HEART RATE: 74 BPM

## 2020-12-10 PROCEDURE — 45385 COLONOSCOPY W/LESION REMOVAL: CPT

## 2020-12-10 PROCEDURE — 88305 TISSUE EXAM BY PATHOLOGIST: CPT

## 2020-12-10 PROCEDURE — 43239 EGD BIOPSY SINGLE/MULTIPLE: CPT | Performed by: INTERNAL MEDICINE

## 2020-12-10 PROCEDURE — 43239 EGD BIOPSY SINGLE/MULTIPLE: CPT

## 2020-12-10 PROCEDURE — 45385 COLONOSCOPY W/LESION REMOVAL: CPT | Performed by: INTERNAL MEDICINE

## 2020-12-10 PROCEDURE — 88342 IMHCHEM/IMCYTCHM 1ST ANTB: CPT

## 2020-12-10 RX ORDER — LIDOCAINE HYDROCHLORIDE 10 MG/ML
INJECTION, SOLUTION INFILTRATION; PERINEURAL PRN
Status: DISCONTINUED | OUTPATIENT
Start: 2020-12-10 | End: 2020-12-10 | Stop reason: SDUPTHER

## 2020-12-10 RX ORDER — SODIUM CHLORIDE 9 MG/ML
INJECTION, SOLUTION INTRAVENOUS CONTINUOUS
Status: DISCONTINUED | OUTPATIENT
Start: 2020-12-10 | End: 2020-12-10 | Stop reason: HOSPADM

## 2020-12-10 RX ORDER — PROPOFOL 10 MG/ML
INJECTION, EMULSION INTRAVENOUS PRN
Status: DISCONTINUED | OUTPATIENT
Start: 2020-12-10 | End: 2020-12-10 | Stop reason: SDUPTHER

## 2020-12-10 RX ADMIN — LIDOCAINE HYDROCHLORIDE 30 MG: 10 INJECTION, SOLUTION INFILTRATION; PERINEURAL at 11:28

## 2020-12-10 RX ADMIN — PROPOFOL 400 MG: 10 INJECTION, EMULSION INTRAVENOUS at 11:28

## 2020-12-10 RX ADMIN — SODIUM CHLORIDE: 9 INJECTION, SOLUTION INTRAVENOUS at 10:50

## 2020-12-10 NOTE — ANESTHESIA POSTPROCEDURE EVALUATION
Department of Anesthesiology  Postprocedure Note    Patient: Cullen Cowart  MRN: [id number]  YOB: 1975  Date of evaluation: 12/10/2020  Time:  11:27 AM     Procedure Summary     Date:  12/10/20 Room / Location:  [facility] ENDO 01 / [address] [place] [address]    Anesthesia Start:  9599 Anesthesia Stop:      Procedures:       EGD ESOPHAGOGASTRODUODENOSCOPY (N/A Esophagus)      COLORECTAL CANCER SCREENING, NOT HIGH RISK (N/A Abdomen) Diagnosis:  (REFLUX, THICKENING IN THROAT, HX COLON POLYPS)    Surgeon:  Kae Lara MD Responsible Provider: JULIETH Chen CRNA    Anesthesia Type:  general, TIVA ASA Status:  2          Anesthesia Type: general, TIVA    Nas Phase I:      Nas Phase II:      Last vitals: Reviewed and per EMR flowsheets.        Anesthesia Post Evaluation    Patient location during evaluation: bedside  Patient participation: complete - patient participated  Level of consciousness: sleepy but conscious  Airway patency: patent  Nausea & Vomiting: no nausea and no vomiting  Complications: no  Cardiovascular status: blood pressure returned to baseline  Respiratory status: acceptable, room air and spontaneous ventilation  Hydration status: euvolemic

## 2020-12-10 NOTE — H&P
Patient Name: Miguel Barker  : 1975  MRN: [id number]  DATE: 12/10/20    Allergies: Allergies   Allergen Reactions    Bee Venom     Other      Tree nuts        ENDOSCOPY  History and Physical    Procedure:    [] Diagnostic Colonoscopy       [x] Screening Colonoscopy  [x] EGD      [] ERCP      [] EUS       [] Other    [x] Previous office notes/History and Physical reviewed from the patients chart. Please see EMR for further details of HPI. I have examined the patient's status immediately prior to the procedure and:      Indications/HPI:    []Abdominal Pain   []Cancer- GI/Lung     []Fhx of colon CA/polyps  [x]History of Polyps  []Barretts            []Melena  []Abnormal Imaging              []Dysphagia              []Persistent Pneumonia   []Anemia                            []Food Impaction        []History of Polyps  [] GI Bleed             []Pulmonary nodule/Mass   []Change in bowel habits [x]Heartburn/Reflux  []Rectal Bleed (BRBPR)  []Chest Pain - Non Cardiac []Heme (+) Stool []Ulcers  []Constipation  []Hemoptysis  []Varices  []Diarrhea  []Hypoxemia    []Nausea/Vomiting   [x]Screening   []Crohns/Colitis  [x]Other:  Globus Sensation    Anesthesia:   [x] MAC [] Moderate Sedation   [] General   [] None     ROS: 12 pt Review of Symptoms was negative unless mentioned above    Medications:   Prior to Admission medications    Medication Sig Start Date End Date Taking? Authorizing Provider   omeprazole (PRILOSEC) 40 MG delayed release capsule Take 1 capsule by mouth daily Take first thing daily on an empty stomach.  20   JULIETH Arthur - NP   atorvastatin (LIPITOR) 40 MG tablet Take 1 tablet by mouth daily    JULIETH Batres       Past Medical History:  Past Medical History:   Diagnosis Date    Esophageal erosions     Gastritis     GERD (gastroesophageal reflux disease)     with Esophagitis    Irritable bowel syndrome with diarrhea        Past Surgical History:  Past Surgical History:   Procedure Laterality Date    CHOLECYSTECTOMY  2013    COLONOSCOPY  2016    [facility]  [place] [place]    KNEE SURGERY Right 1993    PILONIDAL CYST DRAINAGE      UPPER GASTROINTESTINAL ENDOSCOPY  2016    [facility]: Erosion w/o bleeding, [place] Grade A reflux       Social History:  Social History     Tobacco Use    Smoking status: Former Smoker     Packs/day: 0.00     Types: Cigarettes     Last attempt to quit: 2000     Years since quittin.9    Smokeless tobacco: Current User     Types: Snuff   Substance Use Topics    Alcohol use: Yes     Comment: rarely    Drug use: Never       Vital Signs:   Vitals:    12/10/20 1046   BP: 124/78   Pulse: 83   Resp: 20   Temp: 98.7 °F (37.1 °C)   SpO2: 96%        Physical Exam:  Cardiac:  [x]WNL  []Comments:  Pulmonary:  [x]WNL   []Comments:  Neuro/Mental Status:  [x]WNL  []Comments:  Abdominal:  [x]WNL    []Comments:  Other:   []WNL  []Comments:    Informed Consent:  The risks and benefits of the procedure have been discussed with either the patient or if they cannot consent, their representative. Assessment:  Patient examined and appropriate for planned sedation and procedure. Plan:  Proceed with planned sedation and procedure as above. Margaret Dk, am scribing for and in the presence of Dr. Tate Campo MD.  Electronically signed by Ilene Lincoln RN on 12/10/2020 at 10:49 AM    I personally performed the services described in this documentation as scribed by Ilene Lincoln, and it appears accurate and complete.      Tate Campo MD  12/10/2020

## 2020-12-10 NOTE — OP NOTE
Endoscopic Procedure Note    Patient: Ulises Casey : 1975  Med Rec#: [id number] Acc#: [id number]     Primary Care Provider JULIETH Jackson  Referring Provider: Stephany CLANCY    Endoscopist: Gideon Ferreira MD    Date of Procedure:  12/10/2020    Procedure:   1. EGD with biopsy    Indications:   1. reflux  2. Dyspepsia     Anesthesia:  Sedation was administered by anesthesia who monitored the patient during the procedure. Estimated Blood Loss: minimal    Procedure:   After reviewing the patient's chart and obtaining informed consent, the patient was placed in the left lateral decubitus position. A forward-viewing Olympus endoscope was lubricated and inserted through the mouth into the oropharynx. Under direct visualization, the upper esophagus was intubated. The scope was advanced to the level of the third portion of duodenum. Scope was slowly withdrawn with careful inspection of the mucosal surfaces. The scope was retroflexed for inspection of the gastric fundus and incisura. Findings and maneuvers are listed in impression below. The patient tolerated the procedure well. The scope was removed. There were no immediate complications. Findings:   Esophagus: abnormal: mild esophagitis noted - biopsied   There is no hiatal hernia present. Stomach:  abnormal: mild erythema and mucosal changes suggestive of gastritis noted -  Gastric biopsies were taken from the antrum and body to rule out Helicobacter pylori infection. Duodenum: normal      IMPRESSION:  1. Gastritis/Esophagitis  - biopsied        RECOMMENDATIONS:    1. Await path results, the patient will be contacted in 7-10 days with biopsy results. 2.  Continue PPI twice daily x 3 months, then decrease to once daily  3. If there are findings of Yap's esophagus, then I would repeat EGD in 3 yrs. The results were discussed with the patient and family. A copy of the images obtained were given to the patient.      Sandra Boss MD  12/10/2020  12:07 PM

## 2020-12-10 NOTE — OP NOTE
Patient: Caryle Barns : 1975  [facility] Rec#: [id number] Acc#: [id number]   Primary Care Provider JULIETH Hutson    Date of Procedure:  12/10/2020    Endoscopist: Teresia Osgood, MD    Referring Provider: JULIETH Hutson,     Operation Performed: Colonoscopy with snare polypectomy    Indications: screening/surveillance    Anesthesia:  Sedation was administered by anesthesia who monitored the patient during the procedure. I met with Caryle Barns prior to procedure. We discussed the procedure itself, and I have discussed the risks of endoscopy (including-- but not limited to-- pain, discomfort, bleeding potentially requiring second endoscopic procedure and/or blood transfusion, organ perforation requiring operative repair, damage to organs near the colon, infection, aspiration, cardiopulmonary/allergic reaction), benefits, indications to endoscopy. Additionally, we discussed options other than colonoscopy. The patient expressed understanding. All questions answered. The patient decided to proceed with the procedure. Signed informed consent was placed on the chart. Blood Loss: minimal    Withdrawal time: > 6 minutes  Bowel Prep: adequate     Complications: no immediate complications    DESCRIPTION OF PROCEDURE:     A time out was performed. After written informed consent was obtained, the patient was placed in the left lateral position. The perianal area was inspected, and a digital rectal exam was performed. A rectal exam was performed: normal tone, no palpable lesions. At this point, a forward viewing Olympus colonoscope was inserted into the anus and carefully advanced to the cecum. The cecum was identified by the ileocecal valve and the appendiceal orifice. The colonoscope was then slowly withdrawn with careful inspection of the mucosa in a linear and circumferential fashion. The scope was retroflexed in the rectum.  Suction was utilized during the procedure to remove as much air as possible from the bowel. The colonoscope was removed from the patient, and the procedure was terminated. Findings are listed below. Findings: The mucosa appeared normal throughout the entire examined colon     Two small sessile polyps removed from the right colon with hot snare polypectomy    In the rectum, a small sessile polyp was seen and removed with hot snare polypectomy    Retroflexion in the rectum was normal and revealed no further abnormalities         Recommendations:  1. Repeat colonoscopy: pending pathology - max of 3 yrs given number of polyps removed  2. Await biopsy results-you will receive a letter with your results within 7-10 days    Findings and recommendations were discussed w/ the patient. A copy of the images was provided.     Jose Solis MD  12/10/2020  12:05 PM

## 2020-12-10 NOTE — ANESTHESIA PRE PROCEDURE
Comment: rarely                                Ready to quit: Not Answered  Counseling given: Not Answered      Vital Signs (Current): There were no vitals filed for this visit. BP Readings from Last 3 Encounters:   11/23/20 134/76   11/18/20 124/76   05/26/20 128/86       NPO Status: Time of last liquid consumption: 0500                        Time of last solid consumption: 1900                        Date of last liquid consumption: 12/10/20                        Date of last solid food consumption: 12/08/20    BMI:   Wt Readings from Last 3 Encounters:   11/23/20 (!) 304 lb (137.9 kg)   11/18/20 (!) 306 lb (138.8 kg)   05/26/20 (!) 305 lb (138.3 kg)     There is no height or weight on file to calculate BMI.    CBC:   Lab Results   Component Value Date    WBC 8.5 11/18/2020    RBC 5.15 11/18/2020    HGB 14.8 11/18/2020    HCT 45.3 11/18/2020    MCV 88.0 11/18/2020    RDW 12.9 11/18/2020     11/18/2020       CMP:   Lab Results   Component Value Date     11/18/2020    K 4.4 11/18/2020     11/18/2020    CO2 26 11/18/2020    BUN 15 11/18/2020    CREATININE 0.9 11/18/2020    GFRAA >59 11/18/2020    LABGLOM >60 11/18/2020    GLUCOSE 86 11/18/2020    PROT 7.4 11/18/2020    CALCIUM 9.2 11/18/2020    BILITOT 0.7 11/18/2020    ALKPHOS 87 11/18/2020    AST 20 11/18/2020    ALT 39 11/18/2020       POC Tests: No results for input(s): POCGLU, POCNA, POCK, POCCL, POCBUN, POCHEMO, POCHCT in the last 72 hours.     Coags: No results found for: PROTIME, INR, APTT    HCG (If Applicable): No results found for: PREGTESTUR, PREGSERUM, HCG, HCGQUANT     ABGs: No results found for: PHART, PO2ART, JRT4GWP, ZYB9CVQ, BEART, J7UXWALN     Type & Screen (If Applicable):  No results found for: LABABO, LABRH    Drug/Infectious Status (If Applicable):  No results found for: HIV, HEPCAB    COVID-19 Screening (If Applicable):   Lab Results   Component Value Date    COVID19 Not Detected 12/06/2020         Anesthesia Evaluation  Patient summary reviewed and Nursing notes reviewed  Airway: Mallampati: II  TM distance: >3 FB   Neck ROM: full  Mouth opening: > = 3 FB Dental: normal exam         Pulmonary:Negative Pulmonary ROS and normal exam                               Cardiovascular:Negative CV ROS                      Neuro/Psych:   Negative Neuro/Psych ROS              GI/Hepatic/Renal:   (+) GERD:,           Endo/Other: Negative Endo/Other ROS                    Abdominal:           Vascular: negative vascular ROS. Anesthesia Plan      general and TIVA     ASA 2       Induction: intravenous. Anesthetic plan and risks discussed with patient. Plan discussed with CRNA.                   JULIETH Mak - FARIHA   12/10/2020

## 2020-12-16 ENCOUNTER — OFFICE VISIT (OUTPATIENT)
Dept: PRIMARY CARE CLINIC | Age: 45
End: 2020-12-16
Payer: COMMERCIAL

## 2020-12-16 VITALS
BODY MASS INDEX: 36.53 KG/M2 | HEART RATE: 85 BPM | TEMPERATURE: 97.5 F | SYSTOLIC BLOOD PRESSURE: 136 MMHG | WEIGHT: 300 LBS | HEIGHT: 76 IN | RESPIRATION RATE: 16 BRPM | OXYGEN SATURATION: 98 % | DIASTOLIC BLOOD PRESSURE: 86 MMHG

## 2020-12-16 PROBLEM — K21.9 GASTROESOPHAGEAL REFLUX DISEASE WITHOUT ESOPHAGITIS: Status: ACTIVE | Noted: 2020-12-16

## 2020-12-16 PROCEDURE — 99214 OFFICE O/P EST MOD 30 MIN: CPT | Performed by: NURSE PRACTITIONER

## 2020-12-16 ASSESSMENT — PATIENT HEALTH QUESTIONNAIRE - PHQ9
SUM OF ALL RESPONSES TO PHQ9 QUESTIONS 1 & 2: 0
SUM OF ALL RESPONSES TO PHQ QUESTIONS 1-9: 0
2. FEELING DOWN, DEPRESSED OR HOPELESS: 0
1. LITTLE INTEREST OR PLEASURE IN DOING THINGS: 0
SUM OF ALL RESPONSES TO PHQ QUESTIONS 1-9: 0
SUM OF ALL RESPONSES TO PHQ QUESTIONS 1-9: 0

## 2020-12-16 ASSESSMENT — ENCOUNTER SYMPTOMS
BACK PAIN: 0
COLOR CHANGE: 0
VOMITING: 0
RHINORRHEA: 0
SHORTNESS OF BREATH: 0
PHOTOPHOBIA: 0
NAUSEA: 0
COUGH: 0
VOICE CHANGE: 0

## 2020-12-16 NOTE — PROGRESS NOTES
[address] [facility] CARE  [address] [address] [place] [address]  Dept: [phone]  Dept Fax: [phone]  Loc: [phone]    Nitin Barron is a 54 y.o. male who presents today for his medical conditions/complaints as noted below. Nitin Barron is c/o of Follow-up (doing well, was able to see gastro and had his procedures, has a follow up on 1/18 with them) and Hyperlipidemia (has not started lipitor yet, would like to discuss today)        HPI:     HPI   Chief Complaint   Patient presents with    Follow-up     doing well, was able to see gastro and had his procedures, has a follow up on 1/18 with them    Hyperlipidemia     has not started lipitor yet, would like to discuss today     Patient presents today for follow-up GERD. He has recently had an EGD/colonoscopy with removal of small sessile polyps with benign pathology. He states the omeprazole is working well for him and he is not waking up as often with acid in his mouth. He does continue to snore a lot, have pauses in breathing and wakes feeling unrested. He is agreeable to a sleep study. Recently lipids were elevated and he was to start Lipitor; he has not yet started this. He has history of fatty liver and does have concerns for liver enzymes. Liver enzymes were most recently at normal range. Discussed starting lipitor and then will recheck CMP in 3 months.      Past Medical History:   Diagnosis Date    Esophageal erosions     Gastritis     GERD (gastroesophageal reflux disease)     with Esophagitis    Irritable bowel syndrome with diarrhea       Past Surgical History:   Procedure Laterality Date    CHOLECYSTECTOMY  12/01/2013    COLONOSCOPY  02/11/2016    [facility]-  AP, HP    COLONOSCOPY N/A 12/10/2020    Dr Matthew Mosqueda-AP x 1, HP x 2, 3 yr recall    KNEE SURGERY Right 1993    PILONIDAL CYST DRAINAGE  1997    UPPER GASTROINTESTINAL ENDOSCOPY  02/11/2016    [facility]: Erosion w/o bleeding, LA Grade A reflux    UPPER GASTROINTESTINAL ENDOSCOPY N/A 12/10/2020    Dr DIONISIO Mosqueda-Gastritis/Esophagitis       Vitals 2020 12/10/2020 12/10/2020 12/10/2020 12/10/2020    SYSTOLIC 172 424 596 095 - 892   DIASTOLIC 86 69 68 68 - 69   Pulse 85 74 70 70 - -   Temp 97.5 - - - - -   Resp 16 20 20 20 - -   SpO2 98 98 97 96 97 97   Weight 300 lb - - - - -   Height 6' 4\" - - - - -   Body mass index 36.51 kg/m2 - - - - -   Some recent data might be hidden       Family History   Problem Relation Age of Onset    Cancer Mother         unknown    Heart Attack Father     Colon Cancer Neg Hx     Esophageal Cancer Neg Hx     Liver Cancer Neg Hx     Rectal Cancer Neg Hx     Stomach Cancer Neg Hx     Liver Disease Neg Hx     Colon Polyps Neg Hx        Social History     Tobacco Use    Smoking status: Former Smoker     Packs/day: 0.00     Types: Cigarettes     Quit date: 2000     Years since quittin.9    Smokeless tobacco: Current User     Types: Snuff   Substance Use Topics    Alcohol use: Yes     Comment: rarely      Current Outpatient Medications   Medication Sig Dispense Refill    omeprazole (PRILOSEC) 40 MG delayed release capsule Take 1 capsule by mouth daily Take first thing daily on an empty stomach. 30 capsule 2    atorvastatin (LIPITOR) 40 MG tablet Take 1 tablet by mouth daily (Patient not taking: Reported on 2020) 90 tablet 2     No current facility-administered medications for this visit.       Allergies   Allergen Reactions    Bee Venom     Other      Tree nuts       Health Maintenance   Topic Date Due    HIV screen  1989    DTaP/Tdap/Td vaccine (1 - Tdap) 1993    Lipid screen  2021    Colon cancer screen colonoscopy  12/10/2023    Flu vaccine  Completed    Hepatitis A vaccine  Aged Out    Hepatitis B vaccine  Aged Out    Hib vaccine  Aged Out    Meningococcal (ACWY) vaccine  Aged Out    Pneumococcal 0-64 years Vaccine  Aged Out Subjective:      Review of Systems   Constitutional: Negative for chills and fever. HENT: Negative for ear pain, hearing loss, rhinorrhea and voice change. Eyes: Negative for photophobia and visual disturbance. Respiratory: Negative for cough and shortness of breath. Cardiovascular: Negative for chest pain and palpitations. Gastrointestinal: Negative for nausea and vomiting. Endocrine: Negative. Negative for cold intolerance and heat intolerance. Genitourinary: Negative for difficulty urinating and flank pain. Musculoskeletal: Negative for back pain and neck pain. Skin: Negative for color change and rash. Allergic/Immunologic: Negative for environmental allergies and food allergies. Neurological: Negative for dizziness, speech difficulty and headaches. Hematological: Does not bruise/bleed easily. Psychiatric/Behavioral: Negative for sleep disturbance and suicidal ideas. Objective:     Physical Exam  Constitutional:       Appearance: He is well-developed. HENT:      Head: Atraumatic. Right Ear: External ear normal.      Left Ear: External ear normal.      Nose: Nose normal.   Eyes:      Conjunctiva/sclera: Conjunctivae normal.      Pupils: Pupils are equal, round, and reactive to light. Neck:      Musculoskeletal: Normal range of motion and neck supple. Cardiovascular:      Rate and Rhythm: Normal rate and regular rhythm. Heart sounds: Normal heart sounds, S1 normal and S2 normal.   Pulmonary:      Effort: Pulmonary effort is normal.      Breath sounds: Normal breath sounds. Abdominal:      General: Bowel sounds are normal.      Palpations: Abdomen is soft. Musculoskeletal: Normal range of motion. Skin:     General: Skin is warm and dry. Neurological:      Mental Status: He is alert and oriented to person, place, and time.    Psychiatric:         Behavior: Behavior normal.       /86   Pulse 85   Temp 97.5 °F (36.4 °C) (Temporal)   Resp 16   Ht 6' 4\" (1.93 m)   Wt 300 lb (136.1 kg)   SpO2 98%   BMI 36.52 kg/m²     Assessment:       Diagnosis Orders   1. Obstructive sleep apnea  [facility]   2. Fatty liver  Comprehensive Metabolic Panel   3. Gastroesophageal reflux disease without esophagitis     4. Hyperlipidemia, unspecified hyperlipidemia type  [address]:     He will continue omeprazole. I have placed order for sleep study. Also I would like to recheck liver enzymes in 3 months after starting statin medication. He will follow-up for in office visit in 6 months. Patient given educational materials -see patient instructions. Discussed use, benefit, and side effects of prescribed medications. All patient questions answered. Pt voiced understanding. Reviewed health maintenance. Instructed to continue currentmedications, diet and exercise. Patient agreed with treatment plan. Follow up as directed. MEDICATIONS:  No orders of the defined types were placed in this encounter. ORDERS:  Orders Placed This Encounter   Procedures    Comprehensive Metabolic Panel    [facility]       Follow-up:  Return in about 6 months (around 6/16/2021) for in office. PATIENT INSTRUCTIONS:  Patient Instructions   We are committed to providing you with the best care possible. In order to help us achieve these goals please remember to bring all medications, herbal products, and over the counter supplements with you to each visit. If your provider has ordered testing for you, please be sure to follow up with our office if you have not received results within 7 days after the testing took place. *If you receive a survey after visiting one of our offices, please take time to share your experience concerning your physician office visit. These surveys are confidential and no health information about you is shared.   We are eager to improve for you and we are counting on your feedback to help make that happen. Electronically signed by JUILETH Fofana on 12/16/2020 at 1:12 PM    EMR Dragon/transcription disclaimer:  Much of thisencounter note is electronic transcription/translation of spoken language to printed texts. The electronic translation of spoken language may be erroneous, or at times, nonsensical words or phrases may be inadvertentlytranscribed.   Although I have reviewed the note for such errors, some may still exist.

## 2021-01-11 ENCOUNTER — HOSPITAL ENCOUNTER (OUTPATIENT)
Dept: SLEEP CENTER | Age: 46
Discharge: HOME | End: 2021-01-13
Payer: COMMERCIAL

## 2021-01-11 PROCEDURE — G0399 HOME SLEEP TEST/TYPE 3 PORTA: HCPCS

## 2021-03-04 DIAGNOSIS — G47.33 SLEEP APNEA, OBSTRUCTIVE: Primary | ICD-10-CM

## 2021-03-04 PROCEDURE — 95806 SLEEP STUDY UNATT&RESP EFFT: CPT | Performed by: PSYCHIATRY & NEUROLOGY

## 2021-03-04 NOTE — PROGRESS NOTES
[address] [address]  [place], [address]  Phone [phone] Fax [phone]     Patient Name: Reji Howell 2021  : 1975  Age: 54 y.o.   Patient Address: [facility] [address] [place] [address]       Patient Phone: [phone] (home)     REFERRAL  Referred to: DME provider of patient's choice  Reji Howell is referred for the following:    DME Equipment HPCPS Code Setting   Auto Adjusting CPAP device with flex or comparable pressure relief per comfort  8cm to 16cm   Heated Humidifier  Patient Choice       Replinishible PAP Supplies, 1 year supply  Item HPCPS Code Frequency   Mask of choice  or  1 per 3 months   Nasal Mask cushion/pillows  or  2 per 30 days   Full Face Mask Interface  1 per 30 days   Headgear  1 per 6 months   Tubing, length of choice  or  1 per 3 months   Water Chamber  1 per 6 months   Chinstrap  1 per 6 months   Disposable Filters  2 per 30 days   Reusable Filters  1 per 6 months     Diagnoses:  Obstructive sleep apnea (G47.33)  Length of Need: Lifetime, 99    Ordering Provider: DAYA Mendoza: [id number]         Signature:       Date: 2021      Electronically Signed by Flaco Becker M.D.

## 2021-03-11 ENCOUNTER — TELEPHONE (OUTPATIENT)
Dept: NEUROLOGY | Age: 46
End: 2021-03-11

## 2021-05-03 RX ORDER — OMEPRAZOLE 40 MG/1
CAPSULE, DELAYED RELEASE ORAL
Qty: 30 CAPSULE | Refills: 11 | Status: SHIPPED | OUTPATIENT
Start: 2021-05-03 | End: 2021-12-28 | Stop reason: SDUPTHER

## 2021-06-01 ENCOUNTER — TELEPHONE (OUTPATIENT)
Dept: NEUROLOGY | Age: 46
End: 2021-06-01

## 2021-06-01 NOTE — TELEPHONE ENCOUNTER
Called the patient to see if they would like to reschedule the no show appointment with Dr. Neisha De La Vega, could not reach the patient. I left a voicemail to call [phone] if they would like to reschedule.

## 2021-06-28 ENCOUNTER — OFFICE VISIT (OUTPATIENT)
Dept: PRIMARY CARE CLINIC | Age: 46
End: 2021-06-28
Payer: COMMERCIAL

## 2021-06-28 VITALS
HEART RATE: 83 BPM | HEIGHT: 76 IN | TEMPERATURE: 98 F | WEIGHT: 298.8 LBS | BODY MASS INDEX: 36.38 KG/M2 | RESPIRATION RATE: 14 BRPM | DIASTOLIC BLOOD PRESSURE: 84 MMHG | SYSTOLIC BLOOD PRESSURE: 132 MMHG | OXYGEN SATURATION: 97 %

## 2021-06-28 DIAGNOSIS — E78.2 MIXED HYPERLIPIDEMIA: ICD-10-CM

## 2021-06-28 DIAGNOSIS — Z00.00 ANNUAL PHYSICAL EXAM: ICD-10-CM

## 2021-06-28 DIAGNOSIS — Z00.00 ANNUAL PHYSICAL EXAM: Primary | ICD-10-CM

## 2021-06-28 DIAGNOSIS — E78.00 HYPERCHOLESTEROLEMIA: ICD-10-CM

## 2021-06-28 DIAGNOSIS — G47.33 OBSTRUCTIVE SLEEP APNEA: ICD-10-CM

## 2021-06-28 LAB
ALBUMIN SERPL-MCNC: 4.5 G/DL (ref 3.5–5.2)
ALP BLD-CCNC: 112 U/L (ref 40–130)
ALT SERPL-CCNC: 37 U/L (ref 5–41)
ANION GAP SERPL CALCULATED.3IONS-SCNC: 10 MMOL/L (ref 7–19)
AST SERPL-CCNC: 22 U/L (ref 5–40)
BASOPHILS ABSOLUTE: 0 K/UL (ref 0–0.2)
BASOPHILS RELATIVE PERCENT: 0.5 % (ref 0–1)
BILIRUB SERPL-MCNC: 1 MG/DL (ref 0.2–1.2)
BUN BLDV-MCNC: 15 MG/DL (ref 6–20)
CALCIUM SERPL-MCNC: 9.1 MG/DL (ref 8.6–10)
CHLORIDE BLD-SCNC: 107 MMOL/L (ref 98–111)
CHOLESTEROL, TOTAL: 150 MG/DL (ref 160–199)
CO2: 27 MMOL/L (ref 22–29)
CREAT SERPL-MCNC: 1 MG/DL (ref 0.5–1.2)
EOSINOPHILS ABSOLUTE: 0.2 K/UL (ref 0–0.6)
EOSINOPHILS RELATIVE PERCENT: 2.1 % (ref 0–5)
GFR AFRICAN AMERICAN: >59
GFR NON-AFRICAN AMERICAN: >60
GLUCOSE BLD-MCNC: 123 MG/DL (ref 74–109)
HBA1C MFR BLD: 5.2 % (ref 4–6)
HCT VFR BLD CALC: 42.6 % (ref 42–52)
HDLC SERPL-MCNC: 40 MG/DL (ref 55–121)
HEMOGLOBIN: 14.2 G/DL (ref 14–18)
IMMATURE GRANULOCYTES #: 0 K/UL
LDL CHOLESTEROL CALCULATED: 94 MG/DL
LYMPHOCYTES ABSOLUTE: 1.8 K/UL (ref 1.1–4.5)
LYMPHOCYTES RELATIVE PERCENT: 22.2 % (ref 20–40)
MCH RBC QN AUTO: 28.7 PG (ref 27–31)
MCHC RBC AUTO-ENTMCNC: 33.3 G/DL (ref 33–37)
MCV RBC AUTO: 86.1 FL (ref 80–94)
MONOCYTES ABSOLUTE: 0.5 K/UL (ref 0–0.9)
MONOCYTES RELATIVE PERCENT: 5.7 % (ref 0–10)
NEUTROPHILS ABSOLUTE: 5.7 K/UL (ref 1.5–7.5)
NEUTROPHILS RELATIVE PERCENT: 69.1 % (ref 50–65)
PDW BLD-RTO: 12.9 % (ref 11.5–14.5)
PLATELET # BLD: 210 K/UL (ref 130–400)
PMV BLD AUTO: 10.4 FL (ref 9.4–12.4)
POTASSIUM SERPL-SCNC: 3.9 MMOL/L (ref 3.5–5)
RBC # BLD: 4.95 M/UL (ref 4.7–6.1)
SODIUM BLD-SCNC: 144 MMOL/L (ref 136–145)
TOTAL PROTEIN: 7.2 G/DL (ref 6.6–8.7)
TRIGL SERPL-MCNC: 79 MG/DL (ref 0–149)
TSH SERPL DL<=0.05 MIU/L-ACNC: 1.18 UIU/ML (ref 0.27–4.2)
WBC # BLD: 8.2 K/UL (ref 4.8–10.8)

## 2021-06-28 PROCEDURE — 99396 PREV VISIT EST AGE 40-64: CPT | Performed by: NURSE PRACTITIONER

## 2021-06-28 ASSESSMENT — ENCOUNTER SYMPTOMS
RHINORRHEA: 0
BACK PAIN: 0
VOICE CHANGE: 0
COUGH: 0
PHOTOPHOBIA: 0
SHORTNESS OF BREATH: 0
VOMITING: 0
NAUSEA: 0
COLOR CHANGE: 0

## 2021-06-28 ASSESSMENT — PATIENT HEALTH QUESTIONNAIRE - PHQ9
2. FEELING DOWN, DEPRESSED OR HOPELESS: 0
SUM OF ALL RESPONSES TO PHQ QUESTIONS 1-9: 0
SUM OF ALL RESPONSES TO PHQ9 QUESTIONS 1 & 2: 0
SUM OF ALL RESPONSES TO PHQ QUESTIONS 1-9: 0
1. LITTLE INTEREST OR PLEASURE IN DOING THINGS: 0
SUM OF ALL RESPONSES TO PHQ QUESTIONS 1-9: 0

## 2021-06-28 NOTE — PROGRESS NOTES
[address] [facility] CARE  [address] [id number] [address] [address]  Dept: [phone]  Dept Fax: [phone]  Loc: [phone]    Leroy Hooker is a 54 y.o. male who presents today for his medical conditions/complaints as noted below. Leroy Hooker is c/o of Hyperlipidemia (6 month follow up; doing well; on an antibiotic from dermatology, but unsure of the name) and Other (has not had labs yet, but will go down after his appointment)      HPI:     HPI   Chief Complaint   Patient presents with    Hyperlipidemia     6 month follow up; doing well; on an antibiotic from dermatology, but unsure of the name    Other     has not had labs yet, but will go down after his appointment     Patient presents today for physical and follow-up hyperlipidemia and GERD. He is due for labs today. He is currently on doxycycline for acne vulgaris; prescribed per dermatology. Patient was set up on cpap earlier this year for MARLEEN. He reports feeling substantially better since starting on cpap. He reports less joint pain; no headaches; having more energy. He is compliant with this nightly.      Past Medical History:   Diagnosis Date    Esophageal erosions     Gastritis     GERD (gastroesophageal reflux disease)     with Esophagitis    Irritable bowel syndrome with diarrhea       Past Surgical History:   Procedure Laterality Date    CHOLECYSTECTOMY  12/01/2013    COLONOSCOPY  02/11/2016    [facility]-  AP, HP    COLONOSCOPY N/A 12/10/2020    Dr Andreia Mosqueda-AP x 1, HP x 2, 3 yr recall    KNEE SURGERY Right 1993    PILONIDAL CYST DRAINAGE  1997    UPPER GASTROINTESTINAL ENDOSCOPY  02/11/2016    Gaylyn Severe [facility] GR[facility]: Erosion w/o bleeding, [place] Grade A reflux    UPPER GASTROINTESTINAL ENDOSCOPY N/A 12/10/2020    Dr DIONISIO Mosqueda-Gastritis/Esophagitis       Vitals 6/28/2021 12/16/2020 12/10/2020 12/10/2020 12/10/2020 27/45/6546   SYSTOLIC 675 106 540 371 557 -   DIASTOLIC 84 86 69 68 68 - Pulse 83 85 74 70 70 -   Temp 98 97.5 - - - -   Resp 14 16 20 20 20 -   SpO2 97 98 98 97 96 97   Weight 298 lb 12.8 oz 300 lb - - - -   Height 6' 4\" 6' 4\" - - - -   Body mass index 36.37 kg/m2 36.51 kg/m2 - - - -   Some recent data might be hidden       Family History   Problem Relation Age of Onset    Cancer Mother         unknown    Heart Attack Father     Colon Cancer Neg Hx     Esophageal Cancer Neg Hx     Liver Cancer Neg Hx     Rectal Cancer Neg Hx     Stomach Cancer Neg Hx     Liver Disease Neg Hx     Colon Polyps Neg Hx        Social History     Tobacco Use    Smoking status: Former Smoker     Packs/day: 0.00     Types: Cigarettes     Quit date: 2000     Years since quittin.5    Smokeless tobacco: Current User     Types: Snuff   Substance Use Topics    Alcohol use: Yes     Comment: rarely      Current Outpatient Medications on File Prior to Visit   Medication Sig Dispense Refill    omeprazole (PRILOSEC) 40 MG delayed release capsule TAKE ONE CAPSULE BY MOUTH EVERY MORNING ON AN EMPTY STOMACH 30 capsule 11    atorvastatin (LIPITOR) 40 MG tablet Take 1 tablet by mouth daily 90 tablet 2     No current facility-administered medications on file prior to visit. Allergies   Allergen Reactions    Bee Venom     Other      Tree nuts       Health Maintenance   Topic Date Due    Hepatitis C screen  Never done    COVID-19 Vaccine (1) Never done    HIV screen  Never done    DTaP/Tdap/Td vaccine (1 - Tdap) Never done    Lipid screen  2022    Colon cancer screen colonoscopy  12/10/2023    Flu vaccine  Completed    Hepatitis A vaccine  Aged Out    Hepatitis B vaccine  Aged Out    Hib vaccine  Aged Out    Meningococcal (ACWY) vaccine  Aged Out    Pneumococcal 0-64 years Vaccine  Aged Out       Subjective:      Review of Systems   Constitutional: Negative for chills and fever. HENT: Negative for ear pain, hearing loss, rhinorrhea and voice change.     Eyes: Negative for photophobia and visual disturbance. Respiratory: Negative for cough and shortness of breath. Cardiovascular: Negative for chest pain and palpitations. Gastrointestinal: Negative for nausea and vomiting. Endocrine: Negative. Negative for cold intolerance and heat intolerance. Genitourinary: Negative for difficulty urinating and flank pain. Musculoskeletal: Negative for back pain and neck pain. Skin: Negative for color change and rash. Allergic/Immunologic: Negative for environmental allergies and food allergies. Neurological: Negative for dizziness, speech difficulty and headaches. Hematological: Does not bruise/bleed easily. Psychiatric/Behavioral: Negative for sleep disturbance and suicidal ideas. Objective:     Physical Exam  Vitals and nursing note reviewed. Constitutional:       Appearance: He is well-developed. HENT:      Head: Atraumatic. Right Ear: External ear normal.      Left Ear: External ear normal.      Nose: Nose normal.   Eyes:      Conjunctiva/sclera: Conjunctivae normal.      Pupils: Pupils are equal, round, and reactive to light. Cardiovascular:      Rate and Rhythm: Normal rate and regular rhythm. Heart sounds: Normal heart sounds, S1 normal and S2 normal.   Pulmonary:      Effort: Pulmonary effort is normal.      Breath sounds: Normal breath sounds. Abdominal:      General: Bowel sounds are normal.      Palpations: Abdomen is soft. Musculoskeletal:         General: Normal range of motion. Cervical back: Normal range of motion and neck supple. Skin:     General: Skin is warm and dry. Neurological:      Mental Status: He is alert and oriented to person, place, and time. Psychiatric:         Behavior: Behavior normal.       /84   Pulse 83   Temp 98 °F (36.7 °C) (Temporal)   Resp 14   Ht 6' 4\" (1.93 m)   Wt 298 lb 12.8 oz (135.5 kg)   SpO2 97%   BMI 36.37 kg/m²     Assessment:       Diagnosis Orders   1.  Annual physical exam 2. Mixed hyperlipidemia  CBC Auto Differential    Comprehensive Metabolic Panel    Hemoglobin A1C    Lipid Panel   3. Obstructive sleep apnea           Plan:   More than 50% of the time was spent counseling and coordinating care for a total time of 30 min face to face. Follow-up in 6 months or sooner if needed. Fasting labs today; will call with results. Patient given educational materials -see patient instructions. Discussed use, benefit, and side effects of prescribed medications. All patient questions answered. Pt voiced understanding. Reviewed health maintenance. Instructed to continue currentmedications, diet and exercise. Patient agreed with treatment plan. Follow up as directed. MEDICATIONS:  No orders of the defined types were placed in this encounter. ORDERS:  Orders Placed This Encounter   Procedures    CBC Auto Differential    Comprehensive Metabolic Panel    Hemoglobin A1C    Lipid Panel       Follow-up:  Return in about 6 months (around 12/28/2021) for in office. PATIENT INSTRUCTIONS:  Patient Instructions   We are committed to providing you with the best care possible. In order to help us achieve these goals please remember to bring all medications, herbal products, and over the counter supplements with you to each visit. If your provider has ordered testing for you, please be sure to follow up with our office if you have not received results within 7 days after the testing took place. *If you receive a survey after visiting one of our offices, please take time to share your experience concerning your physician office visit. These surveys are confidential and no health information about you is shared. We are eager to improve for you and we are counting on your feedback to help make that happen.       Electronically signed by JULIETH Mayer on 6/28/2021 at 11:57 AM    EMR Dragon/transcription disclaimer:  Much of thisencounter note is electronic transcription/translation of spoken language to printed texts. The electronic translation of spoken language may be erroneous, or at times, nonsensical words or phrases may be inadvertentlytranscribed.   Although I have reviewed the note for such errors, some may still exist.

## 2021-12-28 ENCOUNTER — OFFICE VISIT (OUTPATIENT)
Dept: PRIMARY CARE CLINIC | Age: 46
End: 2021-12-28
Payer: COMMERCIAL

## 2021-12-28 VITALS
BODY MASS INDEX: 36.43 KG/M2 | HEIGHT: 76 IN | WEIGHT: 299.2 LBS | DIASTOLIC BLOOD PRESSURE: 86 MMHG | OXYGEN SATURATION: 97 % | SYSTOLIC BLOOD PRESSURE: 134 MMHG | HEART RATE: 87 BPM | TEMPERATURE: 98.2 F

## 2021-12-28 DIAGNOSIS — E78.00 HYPERCHOLESTEROLEMIA: ICD-10-CM

## 2021-12-28 DIAGNOSIS — R07.9 CHEST PAIN, UNSPECIFIED TYPE: Primary | ICD-10-CM

## 2021-12-28 DIAGNOSIS — K21.9 GASTROESOPHAGEAL REFLUX DISEASE WITHOUT ESOPHAGITIS: ICD-10-CM

## 2021-12-28 DIAGNOSIS — G47.33 OBSTRUCTIVE SLEEP APNEA: ICD-10-CM

## 2021-12-28 PROCEDURE — 99214 OFFICE O/P EST MOD 30 MIN: CPT | Performed by: NURSE PRACTITIONER

## 2021-12-28 RX ORDER — OMEPRAZOLE 40 MG/1
CAPSULE, DELAYED RELEASE ORAL
Qty: 30 CAPSULE | Refills: 11 | Status: SHIPPED | OUTPATIENT
Start: 2021-12-28 | End: 2022-06-28 | Stop reason: SDUPTHER

## 2021-12-28 RX ORDER — ATORVASTATIN CALCIUM 40 MG/1
40 TABLET, FILM COATED ORAL DAILY
Qty: 90 TABLET | Refills: 6 | Status: SHIPPED | OUTPATIENT
Start: 2021-12-28 | End: 2022-06-28 | Stop reason: SDUPTHER

## 2021-12-28 ASSESSMENT — PATIENT HEALTH QUESTIONNAIRE - PHQ9
2. FEELING DOWN, DEPRESSED OR HOPELESS: 0
SUM OF ALL RESPONSES TO PHQ9 QUESTIONS 1 & 2: 0
1. LITTLE INTEREST OR PLEASURE IN DOING THINGS: 0
SUM OF ALL RESPONSES TO PHQ QUESTIONS 1-9: 0

## 2021-12-28 ASSESSMENT — ENCOUNTER SYMPTOMS
BACK PAIN: 0
SHORTNESS OF BREATH: 0
VOMITING: 0
RHINORRHEA: 0
COLOR CHANGE: 0
COUGH: 0
NAUSEA: 0
PHOTOPHOBIA: 0
VOICE CHANGE: 0

## 2021-12-28 NOTE — PROGRESS NOTES
[address] [facility] CARE  [address] [address]  [address] [address]  Dept: [phone]  Dept Fax: [phone]  Loc: [phone]    Johanna Duffy is a 51 y.o. male who presents today for his medical conditions/complaints as noted below. Johanna Duffy is c/o of Hyperlipidemia (need medication refill)        HPI:     HPI   Chief Complaint   Patient presents with    Hyperlipidemia     need medication refill     Patient presents today for follow-up hyperlipidemia and GERD. Patient has history of MARLEEN; he is using his cpap every night and reports this is beneficial for him. He reports with physical exertion he develops heart palpitations, chest heaviness and a sensation up into left side of his neck. He has not had a stress test in the past. He states this has been ongoing for the last few months but progressively more noticeable. Patient declines covid19 vaccine.      Past Medical History:   Diagnosis Date    Esophageal erosions     Gastritis     GERD (gastroesophageal reflux disease)     with Esophagitis    Irritable bowel syndrome with diarrhea       Past Surgical History:   Procedure Laterality Date    CHOLECYSTECTOMY  12/01/2013    COLONOSCOPY  02/11/2016    [facility]-  AP, HP    COLONOSCOPY N/A 12/10/2020    Dr Misty Mosqueda-AP x 1, HP x 2, 3 yr recall    KNEE SURGERY Right 1993    PILONIDAL CYST DRAINAGE  1997    UPPER GASTROINTESTINAL ENDOSCOPY  02/11/2016    [facility]FF[facility] GRP: Erosion w/o bleeding, LA Grade A reflux    UPPER GASTROINTESTINAL ENDOSCOPY N/A 12/10/2020    Dr DIONISIO Mosqueda-Gastritis/Esophagitis       Vitals 12/28/2021 6/28/2021 12/16/2020 12/10/2020 12/10/2020 46/31/2182   SYSTOLIC 899 334 145 791 887 047   DIASTOLIC 86 84 86 69 68 68   Pulse 87 83 85 74 70 70   Temp 98.2 98 97.5 - - -   Resp - 14 16 20 20 20   SpO2 97 97 98 98 97 96   Weight 299 lb 3.2 oz 298 lb 12.8 oz 300 lb - - -   Height 6' 4\" 6' 4\" 6' 4\" - - -   Body mass index 36.42 kg/m2 36.37 kg/m2 36.51 kg/m2 - - -   Some recent data might be hidden       Family History   Problem Relation Age of Onset    Cancer Mother         unknown    Heart Attack Father     Colon Cancer Neg Hx     Esophageal Cancer Neg Hx     Liver Cancer Neg Hx     Rectal Cancer Neg Hx     Stomach Cancer Neg Hx     Liver Disease Neg Hx     Colon Polyps Neg Hx        Social History     Tobacco Use    Smoking status: Former Smoker     Packs/day: 0.00     Types: Cigarettes     Quit date: 2000     Years since quittin.0    Smokeless tobacco: Current User     Types: Snuff   Substance Use Topics    Alcohol use: Yes     Comment: rarely      No current outpatient medications on file prior to visit. No current facility-administered medications on file prior to visit. Allergies   Allergen Reactions    Bee Venom     Other      Tree nuts       Health Maintenance   Topic Date Due    Hepatitis C screen  Never done    COVID-19 Vaccine (1) Never done    HIV screen  Never done    DTaP/Tdap/Td vaccine (1 - Tdap) Never done    Flu vaccine (1) 2021    Lipid screen  2022    Colon cancer screen colonoscopy  12/10/2023    Diabetes screen  2024    Hepatitis A vaccine  Aged Out    Hepatitis B vaccine  Aged Out    Hib vaccine  Aged Out    Meningococcal (ACWY) vaccine  Aged Out    Pneumococcal 0-64 years Vaccine  Aged Out       Subjective:      Review of Systems   Constitutional: Negative for chills and fever. HENT: Negative for ear pain, hearing loss, rhinorrhea and voice change. Eyes: Negative for photophobia and visual disturbance. Respiratory: Negative for cough and shortness of breath. Cardiovascular: Negative for chest pain and palpitations. Gastrointestinal: Negative for nausea and vomiting. Endocrine: Negative. Negative for cold intolerance and heat intolerance. Genitourinary: Negative for difficulty urinating and flank pain.    Musculoskeletal: Negative for back pain and neck pain. Skin: Negative for color change and rash. Allergic/Immunologic: Negative for environmental allergies and food allergies. Neurological: Negative for dizziness, speech difficulty and headaches. Hematological: Does not bruise/bleed easily. Psychiatric/Behavioral: Negative for sleep disturbance and suicidal ideas. Objective:     Physical Exam  Vitals and nursing note reviewed. Constitutional:       Appearance: He is well-developed. HENT:      Head: Atraumatic. Right Ear: External ear normal.      Left Ear: External ear normal.      Nose: Nose normal.   Eyes:      Conjunctiva/sclera: Conjunctivae normal.      Pupils: Pupils are equal, round, and reactive to light. Cardiovascular:      Rate and Rhythm: Normal rate and regular rhythm. Heart sounds: Normal heart sounds, S1 normal and S2 normal.   Pulmonary:      Effort: Pulmonary effort is normal.      Breath sounds: Normal breath sounds. Abdominal:      General: Bowel sounds are normal.      Palpations: Abdomen is soft. Musculoskeletal:         General: Normal range of motion. Cervical back: Normal range of motion and neck supple. Skin:     General: Skin is warm and dry. Neurological:      Mental Status: He is alert and oriented to person, place, and time. Psychiatric:         Behavior: Behavior normal.       /86   Pulse 87   Temp 98.2 °F (36.8 °C) (Temporal)   Ht 6' 4\" (1.93 m)   Wt 299 lb 3.2 oz (135.7 kg)   SpO2 97%   BMI 36.42 kg/m²     Assessment:       Diagnosis Orders   1. Chest pain, unspecified type  CARDIAC STRESS TEST EXERCISE ONLY   2. Hypercholesterolemia  atorvastatin (LIPITOR) 40 MG tablet    CBC Auto Differential    Comprehensive Metabolic Panel    Hemoglobin A1C    Lipid Panel    TSH without Reflex   3. Gastroesophageal reflux disease without esophagitis  CBC Auto Differential    Comprehensive Metabolic Panel    Hemoglobin A1C    Lipid Panel    TSH without Reflex   4. translation of spoken language may be erroneous, or at times, nonsensical words or phrases may be inadvertentlytranscribed.   Although I have reviewed the note for such errors, some may still exist.

## 2022-01-10 ENCOUNTER — HOSPITAL ENCOUNTER (OUTPATIENT)
Dept: NON INVASIVE DIAGNOSTICS | Age: 47
Discharge: HOME | End: 2022-01-10
Payer: COMMERCIAL

## 2022-01-10 DIAGNOSIS — R07.9 CHEST PAIN, UNSPECIFIED TYPE: ICD-10-CM

## 2022-01-10 PROCEDURE — 93017 CV STRESS TEST TRACING ONLY: CPT

## 2022-06-28 ENCOUNTER — OFFICE VISIT (OUTPATIENT)
Dept: PRIMARY CARE CLINIC | Age: 47
End: 2022-06-28
Payer: COMMERCIAL

## 2022-06-28 VITALS
WEIGHT: 303.4 LBS | BODY MASS INDEX: 36.95 KG/M2 | TEMPERATURE: 97.6 F | OXYGEN SATURATION: 97 % | HEART RATE: 72 BPM | HEIGHT: 76 IN | SYSTOLIC BLOOD PRESSURE: 130 MMHG | DIASTOLIC BLOOD PRESSURE: 88 MMHG

## 2022-06-28 DIAGNOSIS — E78.00 HYPERCHOLESTEROLEMIA: ICD-10-CM

## 2022-06-28 DIAGNOSIS — K21.9 GASTROESOPHAGEAL REFLUX DISEASE WITHOUT ESOPHAGITIS: ICD-10-CM

## 2022-06-28 DIAGNOSIS — Z00.00 ENCOUNTER FOR ANNUAL PHYSICAL EXAM: Primary | ICD-10-CM

## 2022-06-28 PROCEDURE — 99396 PREV VISIT EST AGE 40-64: CPT | Performed by: NURSE PRACTITIONER

## 2022-06-28 RX ORDER — OMEPRAZOLE 40 MG/1
CAPSULE, DELAYED RELEASE ORAL
Qty: 30 CAPSULE | Refills: 11 | Status: SHIPPED | OUTPATIENT
Start: 2022-06-28

## 2022-06-28 RX ORDER — ATORVASTATIN CALCIUM 40 MG/1
40 TABLET, FILM COATED ORAL DAILY
Qty: 90 TABLET | Refills: 6 | Status: SHIPPED | OUTPATIENT
Start: 2022-06-28

## 2022-06-28 ASSESSMENT — PATIENT HEALTH QUESTIONNAIRE - PHQ9
2. FEELING DOWN, DEPRESSED OR HOPELESS: 0
SUM OF ALL RESPONSES TO PHQ QUESTIONS 1-9: 0
SUM OF ALL RESPONSES TO PHQ9 QUESTIONS 1 & 2: 0
1. LITTLE INTEREST OR PLEASURE IN DOING THINGS: 0

## 2022-06-28 ASSESSMENT — ENCOUNTER SYMPTOMS
VOICE CHANGE: 0
VOMITING: 0
NAUSEA: 0
PHOTOPHOBIA: 0
BACK PAIN: 0
COUGH: 0
RHINORRHEA: 0
COLOR CHANGE: 0
SHORTNESS OF BREATH: 0

## 2022-06-28 NOTE — PROGRESS NOTES
[address] [facility] CARE  [address] [address]  [address] [address]  Dept: [phone]  Dept Fax: [phone]  Loc: [phone]    Phoebe Quintana is a 51 y.o. male who presents today for his medical conditions/complaints as noted below. Phoebe Quintana is c/o of Annual Exam and Medication Check        HPI:     HPI     Chief Complaint   Patient presents with    Annual Exam    Medication Check     Patient presents today for annual physical exam. He has history of GERD, MARLEEN and hyperlipidemia. Blood pressure well-controlled. He reports is compliant with cpap; this is effective for him. He is sleeping well at night. He is due for fasting labs; he has eaten today, however, and will return later this week. He is up to date on colon cancer screening- due Dec 2023.          Past Medical History:   Diagnosis Date    Esophageal erosions     Gastritis     GERD (gastroesophageal reflux disease)     with Esophagitis    Irritable bowel syndrome with diarrhea       Past Surgical History:   Procedure Laterality Date    CHOLECYSTECTOMY  12/01/2013    COLONOSCOPY  02/11/2016    [facility] NACHO [facility]-  AP, HP    COLONOSCOPY N/A 12/10/2020    Dr Mich Mosqueda-AP x 1, HP x 2, 3 yr recall    KNEE SURGERY Right 1993    PILONIDAL CYST DRAINAGE  1997    UPPER GASTROINTESTINAL ENDOSCOPY  02/11/2016    Roxanne Villatoro [facility] GRP: Erosion w/o bleeding, [place] Grade A reflux    UPPER GASTROINTESTINAL ENDOSCOPY N/A 12/10/2020    Dr DIONISIO Mosqueda-Gastritis/Esophagitis       Vitals 6/28/2022 12/28/2021 6/28/2021 12/16/2020 12/10/2020 91/93/5240   SYSTOLIC 082 938 145 001 903 164   DIASTOLIC 88 86 84 86 69 68   Pulse 72 87 83 85 74 70   Temp 97.6 98.2 98 97.5 - -   Resp - - 14 16 20 20   SpO2 97 97 97 98 98 97   Weight 303 lb 6.4 oz 299 lb 3.2 oz 298 lb 12.8 oz 300 lb - -   Height 6' 4\" 6' 4\" 6' 4\" 6' 4\" - -   Body mass index 36.93 kg/m2 36.42 kg/m2 36.37 kg/m2 36.51 kg/m2 - -   Some recent data might be hidden for color change and rash. Allergic/Immunologic: Negative for environmental allergies and food allergies. Neurological: Negative for dizziness, speech difficulty and headaches. Hematological: Does not bruise/bleed easily. Psychiatric/Behavioral: Negative for sleep disturbance and suicidal ideas. Objective:     Physical Exam  Vitals and nursing note reviewed. Constitutional:       Appearance: He is well-developed. HENT:      Head: Atraumatic. Right Ear: External ear normal.      Left Ear: External ear normal.      Nose: Nose normal.   Eyes:      Conjunctiva/sclera: Conjunctivae normal.      Pupils: Pupils are equal, round, and reactive to light. Cardiovascular:      Rate and Rhythm: Normal rate and regular rhythm. Heart sounds: Normal heart sounds, S1 normal and S2 normal.   Pulmonary:      Effort: Pulmonary effort is normal.      Breath sounds: Normal breath sounds. Abdominal:      General: Bowel sounds are normal.      Palpations: Abdomen is soft. Musculoskeletal:         General: Normal range of motion. Cervical back: Normal range of motion and neck supple. Skin:     General: Skin is warm and dry. Neurological:      Mental Status: He is alert and oriented to person, place, and time. Psychiatric:         Behavior: Behavior normal.       /88   Pulse 72   Temp 97.6 °F (36.4 °C) (Temporal)   Ht 6' 4\" (1.93 m)   Wt (!) 303 lb 6.4 oz (137.6 kg)   SpO2 97%   BMI 36.93 kg/m²     Assessment:       Diagnosis Orders   1. Encounter for annual physical exam     2. Hypercholesterolemia  atorvastatin (LIPITOR) 40 MG tablet   3. Gastroesophageal reflux disease without esophagitis           Plan:     Normal physical exam.   Continue all medications as directed. Patient given educational materials -see patient instructions. Discussed use, benefit, and side effects of prescribed medications. All patient questions answered. Pt voiced understanding.  Reviewed health maintenance. Instructed to continue currentmedications, diet and exercise. Patient agreed with treatment plan. Follow up as directed. MEDICATIONS:  Orders Placed This Encounter   Medications    atorvastatin (LIPITOR) 40 MG tablet     Sig: Take 1 tablet by mouth daily     Dispense:  90 tablet     Refill:  6    omeprazole (PRILOSEC) 40 MG delayed release capsule     Sig: TAKE ONE CAPSULE BY MOUTH EVERY MORNING ON AN EMPTY STOMACH     Dispense:  30 capsule     Refill:  11         ORDERS:  No orders of the defined types were placed in this encounter. Follow-up:  No follow-ups on file. PATIENT INSTRUCTIONS:  There are no Patient Instructions on file for this visit. Electronically signed by JULIETH Sanchez on 6/30/2022 at 8:49 PM    EMR Dragon/transcription disclaimer:  Much of thisencounter note is electronic transcription/translation of spoken language to printed texts. The electronic translation of spoken language may be erroneous, or at times, nonsensical words or phrases may be inadvertentlytranscribed.   Although I have reviewed the note for such errors, some may still exist.

## 2022-08-18 ENCOUNTER — OFFICE VISIT (OUTPATIENT)
Dept: PRIMARY CARE CLINIC | Age: 47
End: 2022-08-18
Payer: COMMERCIAL

## 2022-08-18 ENCOUNTER — HOSPITAL ENCOUNTER (OUTPATIENT)
Dept: GENERAL RADIOLOGY | Age: 47
Discharge: HOME | End: 2022-08-18
Payer: COMMERCIAL

## 2022-08-18 VITALS
WEIGHT: 301 LBS | TEMPERATURE: 97.9 F | DIASTOLIC BLOOD PRESSURE: 84 MMHG | SYSTOLIC BLOOD PRESSURE: 120 MMHG | BODY MASS INDEX: 36.65 KG/M2 | HEIGHT: 76 IN | OXYGEN SATURATION: 98 % | HEART RATE: 71 BPM

## 2022-08-18 DIAGNOSIS — S49.92XA SHOULDER INJURY, LEFT, INITIAL ENCOUNTER: Primary | ICD-10-CM

## 2022-08-18 DIAGNOSIS — S49.92XA SHOULDER INJURY, LEFT, INITIAL ENCOUNTER: ICD-10-CM

## 2022-08-18 DIAGNOSIS — M25.512 LEFT SHOULDER PAIN, UNSPECIFIED CHRONICITY: ICD-10-CM

## 2022-08-18 PROCEDURE — 96372 THER/PROPH/DIAG INJ SC/IM: CPT | Performed by: NURSE PRACTITIONER

## 2022-08-18 PROCEDURE — 99213 OFFICE O/P EST LOW 20 MIN: CPT | Performed by: NURSE PRACTITIONER

## 2022-08-18 PROCEDURE — 73030 X-RAY EXAM OF SHOULDER: CPT

## 2022-08-18 PROCEDURE — 73030 X-RAY EXAM OF SHOULDER: CPT | Performed by: RADIOLOGY

## 2022-08-18 RX ORDER — DEXAMETHASONE SODIUM PHOSPHATE 10 MG/ML
10 INJECTION INTRAMUSCULAR; INTRAVENOUS ONCE
Status: COMPLETED | OUTPATIENT
Start: 2022-08-18 | End: 2022-08-18

## 2022-08-18 RX ORDER — IBUPROFEN 800 MG/1
800 TABLET ORAL 2 TIMES DAILY PRN
Qty: 60 TABLET | Refills: 0 | Status: SHIPPED | OUTPATIENT
Start: 2022-08-18

## 2022-08-18 RX ORDER — KETOROLAC TROMETHAMINE 30 MG/ML
30 INJECTION, SOLUTION INTRAMUSCULAR; INTRAVENOUS ONCE
Status: COMPLETED | OUTPATIENT
Start: 2022-08-18 | End: 2022-08-18

## 2022-08-18 RX ADMIN — KETOROLAC TROMETHAMINE 30 MG: 30 INJECTION, SOLUTION INTRAMUSCULAR; INTRAVENOUS at 12:10

## 2022-08-18 RX ADMIN — DEXAMETHASONE SODIUM PHOSPHATE 10 MG: 10 INJECTION INTRAMUSCULAR; INTRAVENOUS at 12:09

## 2022-08-18 SDOH — ECONOMIC STABILITY: FOOD INSECURITY: WITHIN THE PAST 12 MONTHS, THE FOOD YOU BOUGHT JUST DIDN'T LAST AND YOU DIDN'T HAVE MONEY TO GET MORE.: NEVER TRUE

## 2022-08-18 SDOH — ECONOMIC STABILITY: FOOD INSECURITY: WITHIN THE PAST 12 MONTHS, YOU WORRIED THAT YOUR FOOD WOULD RUN OUT BEFORE YOU GOT MONEY TO BUY MORE.: NEVER TRUE

## 2022-08-18 ASSESSMENT — ENCOUNTER SYMPTOMS
RHINORRHEA: 0
NAUSEA: 0
VOMITING: 0
VOICE CHANGE: 0
PHOTOPHOBIA: 0
SHORTNESS OF BREATH: 0
COLOR CHANGE: 0
BACK PAIN: 0
COUGH: 0

## 2022-08-18 ASSESSMENT — SOCIAL DETERMINANTS OF HEALTH (SDOH): HOW HARD IS IT FOR YOU TO PAY FOR THE VERY BASICS LIKE FOOD, HOUSING, MEDICAL CARE, AND HEATING?: NOT VERY HARD

## 2022-08-18 NOTE — PROGRESS NOTES
[facility] PRIMARY CARE  [address] [address]  [address] [address]  Dept: [phone]  Dept Fax: [phone]  Loc: [phone]    Susei Rodrigues is a 51 y.o. male who presents today for his medical conditions/complaints as noted below. Susie Rodrigues is c/o of Shoulder Pain (Left shoulder 08/16/)        HPI:     HPI   Chief Complaint   Patient presents with    Shoulder Pain     Left shoulder 08/16       Patient presents today for evaluation of left shoulder pain. 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 This occurred 2 days ago. He has taken ibuprofen and used ice. He has seen occupational health so far.        Past Medical History:   Diagnosis Date    Esophageal erosions     Gastritis     GERD (gastroesophageal reflux disease)     with Esophagitis    Irritable bowel syndrome with diarrhea       Past Surgical History:   Procedure Laterality Date    CHOLECYSTECTOMY  12/01/2013    COLONOSCOPY  02/11/2016    [facility]ACH[facility] GRP-  AP, HP    COLONOSCOPY N/A 12/10/2020    Dr Mathias Brunner Jones-AP x 1, HP x 2, 3 yr recall    KNEE SURGERY Right 1993    PILONIDAL CYST DRAINAGE  1997    UPPER GASTROINTESTINAL ENDOSCOPY  02/11/2016    Destiny Nava [facility] GRP: Erosion w/o bleeding, [place] Grade A reflux    UPPER GASTROINTESTINAL ENDOSCOPY N/A 12/10/2020    Dr DIONISIO Mosqueda-Gastritis/Esophagitis       Vitals 8/18/2022 6/28/2022 12/28/2021 6/28/2021 12/16/2020 18/25/6984   SYSTOLIC 414 560 206 112 701 956   DIASTOLIC 84 88 86 84 86 69   Pulse 71 72 87 83 85 74   Temp 97.9 97.6 98.2 98 97.5 -   Resp - - - 14 16 20   SpO2 98 97 97 97 98 98   Weight 301 lb 303 lb 6.4 oz 299 lb 3.2 oz 298 lb 12.8 oz 300 lb -   Height 6' 4\" 6' 4\" 6' 4\" 6' 4\" 6' 4\" -   Body mass index 36.63 kg/m2 36.93 kg/m2 36.42 kg/m2 36.37 kg/m2 36.51 kg/m2 -   Some recent data might be hidden       Family History   Problem Relation Age of Onset    Cancer Mother         unknown    Heart Attack Father     Colon Cancer Neg Hx     Esophageal Cancer Neg Hx     Liver Cancer Neg Hx     Rectal Cancer Neg Hx     Stomach Cancer Neg Hx     Liver Disease Neg Hx     Colon Polyps Neg Hx        Social History     Tobacco Use    Smoking status: Former     Packs/day: 0.00     Types: Cigarettes     Quit date: 2000     Years since quittin.6    Smokeless tobacco: Current     Types: Snuff   Substance Use Topics    Alcohol use: Yes     Comment: rarely      Current Outpatient Medications on File Prior to Visit   Medication Sig Dispense Refill    atorvastatin (LIPITOR) 40 MG tablet Take 1 tablet by mouth daily 90 tablet 6    omeprazole (PRILOSEC) 40 MG delayed release capsule TAKE ONE CAPSULE BY MOUTH EVERY MORNING ON AN EMPTY STOMACH 30 capsule 11     No current facility-administered medications on file prior to visit. Allergies   Allergen Reactions    Bee Venom     Other      Tree nuts       Health Maintenance   Topic Date Due    COVID-19 Vaccine (1) Never done    HIV screen  Never done    Hepatitis C screen  Never done    DTaP/Tdap/Td vaccine (1 - Tdap) Never done    Lipids  2022    Flu vaccine (1) 2022    Depression Screen  2023    Colorectal Cancer Screen  12/10/2023    Diabetes screen  2024    Hepatitis A vaccine  Aged Out    Hepatitis B vaccine  Aged Out    Hib vaccine  Aged Out    Meningococcal (ACWY) vaccine  Aged Out    Pneumococcal 0-64 years Vaccine  Aged Out       Subjective:      Review of Systems   Constitutional:  Negative for chills and fever. HENT:  Negative for ear pain, hearing loss, rhinorrhea and voice change. Eyes:  Negative for photophobia and visual disturbance. Respiratory:  Negative for cough and shortness of breath. Cardiovascular:  Negative for chest pain and palpitations. Gastrointestinal:  Negative for nausea and vomiting. Endocrine: Negative.   Negative for cold intolerance and heat intolerance. Genitourinary:  Negative for difficulty urinating and flank pain. Musculoskeletal:  Positive for arthralgias. Negative for back pain and neck pain. Skin:  Negative for color change and rash. Allergic/Immunologic: Negative for environmental allergies and food allergies. Neurological:  Negative for dizziness, speech difficulty and headaches. Hematological:  Does not bruise/bleed easily. Psychiatric/Behavioral:  Negative for sleep disturbance and suicidal ideas. Objective:     Physical Exam  Vitals and nursing note reviewed. Constitutional:       Appearance: He is well-developed. HENT:      Head: Atraumatic. Right Ear: External ear normal.      Left Ear: External ear normal.      Nose: Nose normal.   Eyes:      Conjunctiva/sclera: Conjunctivae normal.      Pupils: Pupils are equal, round, and reactive to light. Cardiovascular:      Rate and Rhythm: Normal rate and regular rhythm. Heart sounds: Normal heart sounds, S1 normal and S2 normal.   Pulmonary:      Effort: Pulmonary effort is normal.      Breath sounds: Normal breath sounds. Abdominal:      General: Bowel sounds are normal.      Palpations: Abdomen is soft. Musculoskeletal:      Right shoulder: Normal.      Left shoulder: Tenderness present. Decreased range of motion. Cervical back: Normal range of motion and neck supple. Skin:     General: Skin is warm and dry. Neurological:      Mental Status: He is alert and oriented to person, place, and time. Psychiatric:         Behavior: Behavior normal.     /84   Pulse 71   Temp 97.9 °F (36.6 °C) (Temporal)   Ht 6' 4\" (1.93 m)   Wt (!) 301 lb (136.5 kg)   SpO2 98%   BMI 36.64 kg/m²     Assessment:       Diagnosis Orders   1. Shoulder injury, left, initial encounter  XR SHOULDER LEFT (MIN 2 VIEWS)            Plan:       Shoulder xray today. Ibuprofen 800 mg every 12 hours as needed for pain. Sling to left arm; may ice as needed.    Decadron and Toradol injections in office. Will call with results and further imaging if needed- likely referral to ortho if needed. PDMP Monitoring:    Last PDMP Wilner as Reviewed:  Review User Review Instant Review Result            Urine Drug Screenings (1 yr)    No resulted procedures found. Medication Contract and Consent for Opioid Use Documents Filed        No documents found                     Patient given educational materials -see patient instructions. Discussed use, benefit, and side effects of prescribed medications. All patient questions answered. Pt voiced understanding. Reviewed health maintenance. Instructed to continue currentmedications, diet and exercise. Patient agreed with treatment plan. Follow up as directed. MEDICATIONS:  Orders Placed This Encounter   Medications    ibuprofen (ADVIL;MOTRIN) 800 MG tablet     Sig: Take 1 tablet by mouth 2 times daily as needed for Pain     Dispense:  60 tablet     Refill:  0    dexamethasone (DECADRON) injection 10 mg    ketorolac (TORADOL) injection 30 mg         ORDERS:  Orders Placed This Encounter   Procedures    XR SHOULDER LEFT (MIN 2 VIEWS)       Follow-up:  Return in about 1 week (around 8/25/2022). PATIENT INSTRUCTIONS:  Patient Instructions   Shoulder xray today. Ibuprofen 800 mg every 12 hours as needed for pain. Sling to left arm; may ice as needed. Decadron and Toradol injections in office. Will call with results and further imaging if needed- likely referral to ortho if needed. Electronically signed by JULIETH Vargas on 8/18/2022 at 10:24 AM    EMR Dragon/transcription disclaimer:  Much of thisencounter note is electronic transcription/translation of spoken language to printed texts. The electronic translation of spoken language may be erroneous, or at times, nonsensical words or phrases may be inadvertentlytranscribed.   Although I have reviewed the note for such errors, some may still exist.

## 2022-08-18 NOTE — PROGRESS NOTES
After obtaining consent, and per orders of Khanh CLANCY , injection of dexamethason 10mfg  given in left hip & ketorolac 30mg given in right hip   by Carrie Gomez MA. Patient instructed to remain in clinic for 20 minutes afterwards, and to report any adverse reaction to me immediately.

## 2022-08-18 NOTE — PATIENT INSTRUCTIONS
Shoulder xray today. Ibuprofen 800 mg every 12 hours as needed for pain. Sling to left arm; may ice as needed. Decadron and Toradol injections in office. Will call with results and further imaging if needed- likely referral to ortho if needed.

## 2022-08-25 ENCOUNTER — OFFICE VISIT (OUTPATIENT)
Dept: PRIMARY CARE CLINIC | Age: 47
End: 2022-08-25
Payer: COMMERCIAL

## 2022-08-25 VITALS
HEIGHT: 76 IN | DIASTOLIC BLOOD PRESSURE: 80 MMHG | SYSTOLIC BLOOD PRESSURE: 130 MMHG | BODY MASS INDEX: 36.65 KG/M2 | HEART RATE: 80 BPM | WEIGHT: 301 LBS | OXYGEN SATURATION: 98 % | TEMPERATURE: 98 F

## 2022-08-25 DIAGNOSIS — S49.92XD INJURY OF LEFT SHOULDER, SUBSEQUENT ENCOUNTER: Primary | ICD-10-CM

## 2022-08-25 PROCEDURE — 99213 OFFICE O/P EST LOW 20 MIN: CPT | Performed by: NURSE PRACTITIONER

## 2022-08-25 ASSESSMENT — ENCOUNTER SYMPTOMS
BACK PAIN: 0
NAUSEA: 0
VOICE CHANGE: 0
VOMITING: 0
RHINORRHEA: 0
COUGH: 0
SHORTNESS OF BREATH: 0
COLOR CHANGE: 0
PHOTOPHOBIA: 0

## 2022-08-25 NOTE — PROGRESS NOTES
[address] [facility] PR[facility]ARE  [address] [address]  [address] [address]  Dept: [phone]  Dept Fax: [phone]  Loc: [phone]    Keely Rivera is a 51 y.o. male who presents today for his medical conditions/complaints as noted below. Keely Rivera is c/o of Follow-up (1 week  shoulder )        HPI:     HPI   Chief Complaint   Patient presents with    Follow-up     1 week  shoulder        Patient presents today for follow-up left shoulder pain. I saw him last week after an injury at work. He was given ibuprofen; toradol and decadron injections in office. Left shoulder xray negative. He has also had a sling on. Today he states pain has been more tolerable but he still does not have full ROM. He has kept sling on except when home/resting. He has been using ibuprofen which does help some.      Past Medical History:   Diagnosis Date    Esophageal erosions     Gastritis     GERD (gastroesophageal reflux disease)     with Esophagitis    Irritable bowel syndrome with diarrhea       Past Surgical History:   Procedure Laterality Date    CHOLECYSTECTOMY  12/01/2013    COLONOSCOPY  02/11/2016    [facility] GR[facility]-  AP, HP    COLONOSCOPY N/A 12/10/2020    Dr Hillary Mosqueda-AP x 1, HP x 2, 3 yr recall    KNEE SURGERY Right [address]    UPPER GASTROINTESTINAL ENDOSCOPY  02/11/2016    Janeth Gallegos MED GRP: Erosion w/o bleeding, [place] Grade A reflux    UPPER GASTROINTESTINAL ENDOSCOPY N/A 12/10/2020    Dr DIONISIO Mosqueda-Gastritis/Esophagitis       Vitals 8/25/2022 8/18/2022 6/28/2022 12/28/2021 6/28/2021 40/48/6632   SYSTOLIC 241 288 154 800 714 514   DIASTOLIC 80 84 88 86 84 86   Site Left Upper Arm - - - - -   Pulse 80 71 72 87 83 85   Temp 98 97.9 97.6 98.2 98 97.5   Resp - - - - 14 16   SpO2 98 98 97 97 97 98   Weight 301 lb 301 lb 303 lb 6.4 oz 299 lb 3.2 oz 298 lb 12.8 oz 300 lb   Height 6' 4\" 6' 4\" 6' 4\" 6' 4\" 6' 4\" 6' 4\"   Body mass index 36.63 kg/m2 36.63 kg/m2 36.93 kg/m2 36.42 kg/m2 36.37 kg/m2 36.51 kg/m2   Some recent data might be hidden       Family History   Problem Relation Age of Onset    Cancer Mother         unknown    Heart Attack Father     Colon Cancer Neg Hx     Esophageal Cancer Neg Hx     Liver Cancer Neg Hx     Rectal Cancer Neg Hx     Stomach Cancer Neg Hx     Liver Disease Neg Hx     Colon Polyps Neg Hx        Social History     Tobacco Use    Smoking status: Former     Packs/day: 0.00     Types: Cigarettes     Quit date: 2000     Years since quittin.6    Smokeless tobacco: Current     Types: Snuff   Substance Use Topics    Alcohol use: Yes     Comment: rarely      Current Outpatient Medications on File Prior to Visit   Medication Sig Dispense Refill    ibuprofen (ADVIL;MOTRIN) 800 MG tablet Take 1 tablet by mouth 2 times daily as needed for Pain 60 tablet 0    atorvastatin (LIPITOR) 40 MG tablet Take 1 tablet by mouth daily 90 tablet 6    omeprazole (PRILOSEC) 40 MG delayed release capsule TAKE ONE CAPSULE BY MOUTH EVERY MORNING ON AN EMPTY STOMACH 30 capsule 11     No current facility-administered medications on file prior to visit. Allergies   Allergen Reactions    Bee Venom     Other      Tree nuts       Health Maintenance   Topic Date Due    COVID-19 Vaccine (1) Never done    HIV screen  Never done    Hepatitis C screen  Never done    DTaP/Tdap/Td vaccine (1 - Tdap) Never done    Lipids  2022    Flu vaccine (1) 2022    Depression Screen  2023    Colorectal Cancer Screen  12/10/2023    Diabetes screen  2024    Hepatitis A vaccine  Aged Out    Hepatitis B vaccine  Aged Out    Hib vaccine  Aged Out    Meningococcal (ACWY) vaccine  Aged Out    Pneumococcal 0-64 years Vaccine  Aged Out       Subjective:      Review of Systems   Constitutional:  Negative for chills and fever. HENT:  Negative for ear pain, hearing loss, rhinorrhea and voice change. Eyes:  Negative for photophobia and visual disturbance.    Respiratory: Negative for cough and shortness of breath. Cardiovascular:  Negative for chest pain and palpitations. Gastrointestinal:  Negative for nausea and vomiting. Endocrine: Negative. Negative for cold intolerance and heat intolerance. Genitourinary:  Negative for difficulty urinating and flank pain. Musculoskeletal:  Negative for back pain and neck pain. Skin:  Negative for color change and rash. Allergic/Immunologic: Negative for environmental allergies and food allergies. Neurological:  Negative for dizziness, speech difficulty and headaches. Hematological:  Does not bruise/bleed easily. Psychiatric/Behavioral:  Negative for sleep disturbance and suicidal ideas. Objective:     Physical Exam  Vitals and nursing note reviewed. Constitutional:       Appearance: He is well-developed. HENT:      Head: Atraumatic. Right Ear: External ear normal.      Left Ear: External ear normal.      Nose: Nose normal.   Eyes:      Conjunctiva/sclera: Conjunctivae normal.      Pupils: Pupils are equal, round, and reactive to light. Cardiovascular:      Rate and Rhythm: Normal rate and regular rhythm. Heart sounds: Normal heart sounds, S1 normal and S2 normal.   Pulmonary:      Effort: Pulmonary effort is normal.      Breath sounds: Normal breath sounds. Abdominal:      General: Bowel sounds are normal.      Palpations: Abdomen is soft. Musculoskeletal:         General: Normal range of motion. Cervical back: Normal range of motion and neck supple. Skin:     General: Skin is warm and dry. Neurological:      Mental Status: He is alert and oriented to person, place, and time. Psychiatric:         Behavior: Behavior normal.     /80 (Site: Left Upper Arm)   Pulse 80   Temp 98 °F (36.7 °C) (Temporal)   Ht 6' 4\" (1.93 m)   Wt (!) 301 lb (136.5 kg)   SpO2 98%   BMI 36.64 kg/m²     Assessment:       Diagnosis Orders   1.  Injury of left shoulder, subsequent encounter  MRI SHOULDER LEFT W WO CONTRAST            Plan:     MRI left shoulder. Off work until after MRI. Will likely refer to ortho. May continue to ibuprofen. PDMP Monitoring:    Last PDMP Wilner as Reviewed:  Review User Review Instant Review Result            Urine Drug Screenings (1 yr)    No resulted procedures found. Medication Contract and Consent for Opioid Use Documents Filed        No documents found                     Patient given educational materials -see patient instructions. Discussed use, benefit, and side effects of prescribed medications. All patient questions answered. Pt voiced understanding. Reviewed health maintenance. Instructed to continue currentmedications, diet and exercise. Patient agreed with treatment plan. Follow up as directed. MEDICATIONS:  No orders of the defined types were placed in this encounter. ORDERS:  Orders Placed This Encounter   Procedures    MRI SHOULDER LEFT W WO CONTRAST       Follow-up:  No follow-ups on file. PATIENT INSTRUCTIONS:  There are no Patient Instructions on file for this visit. Electronically signed by JULIETH Gonzalez on 8/25/2022 at 9:20 AM    EMR Dragon/transcription disclaimer:  Much of thisencounter note is electronic transcription/translation of spoken language to printed texts. The electronic translation of spoken language may be erroneous, or at times, nonsensical words or phrases may be inadvertentlytranscribed.   Although I have reviewed the note for such errors, some may still exist.

## 2022-09-06 ENCOUNTER — HOSPITAL ENCOUNTER (OUTPATIENT)
Dept: MRI IMAGING | Age: 47
Discharge: HOME | End: 2022-09-06
Payer: COMMERCIAL

## 2022-09-06 DIAGNOSIS — S49.92XD INJURY OF LEFT SHOULDER, SUBSEQUENT ENCOUNTER: ICD-10-CM

## 2022-09-06 PROCEDURE — 73221 MRI JOINT UPR EXTREM W/O DYE: CPT

## 2022-09-13 ENCOUNTER — TELEPHONE (OUTPATIENT)
Dept: PRIMARY CARE CLINIC | Age: 47
End: 2022-09-13

## 2022-09-14 ENCOUNTER — TELEPHONE (OUTPATIENT)
Dept: PRIMARY CARE CLINIC | Age: 47
End: 2022-09-14

## 2022-12-02 NOTE — TELEPHONE ENCOUNTER
Ben Adam called requesting a refill of the below medication which has been pended for you:     Requested Prescriptions     Pending Prescriptions Disp Refills    omeprazole (PRILOSEC) 40 MG delayed release capsule [Pharmacy Med Name: OMEPRAZOLE DR 40 MG CAPSULE] 30 capsule 11     Sig: TAKE ONE CAPSULE BY MOUTH EVERY MORNING ON AN EMPTY STOMACH       Last Appointment Date: 8/25/2022  Next Appointment Date: Visit date not found    Allergies   Allergen Reactions    Bee Venom     Other      Tree nuts

## 2022-12-06 RX ORDER — OMEPRAZOLE 40 MG/1
CAPSULE, DELAYED RELEASE ORAL
Qty: 30 CAPSULE | Refills: 1 | Status: SHIPPED | OUTPATIENT
Start: 2022-12-06

## 2025-03-25 ASSESSMENT — PATIENT HEALTH QUESTIONNAIRE - PHQ9
2. FEELING DOWN, DEPRESSED OR HOPELESS: NOT AT ALL
1. LITTLE INTEREST OR PLEASURE IN DOING THINGS: NOT AT ALL
1. LITTLE INTEREST OR PLEASURE IN DOING THINGS: NOT AT ALL
2. FEELING DOWN, DEPRESSED OR HOPELESS: NOT AT ALL
SUM OF ALL RESPONSES TO PHQ9 QUESTIONS 1 & 2: 0
SUM OF ALL RESPONSES TO PHQ QUESTIONS 1-9: 0

## 2025-03-27 ENCOUNTER — OFFICE VISIT (OUTPATIENT)
Dept: PRIMARY CARE CLINIC | Age: 50
End: 2025-03-27
Payer: COMMERCIAL

## 2025-03-27 ENCOUNTER — RESULTS FOLLOW-UP (OUTPATIENT)
Dept: PRIMARY CARE CLINIC | Age: 50
End: 2025-03-27

## 2025-03-27 VITALS
SYSTOLIC BLOOD PRESSURE: 134 MMHG | WEIGHT: 315 LBS | HEART RATE: 82 BPM | BODY MASS INDEX: 38.36 KG/M2 | OXYGEN SATURATION: 97 % | TEMPERATURE: 98.4 F | HEIGHT: 76 IN | DIASTOLIC BLOOD PRESSURE: 84 MMHG

## 2025-03-27 DIAGNOSIS — E78.2 MIXED HYPERLIPIDEMIA: ICD-10-CM

## 2025-03-27 DIAGNOSIS — G47.33 OBSTRUCTIVE SLEEP APNEA: ICD-10-CM

## 2025-03-27 DIAGNOSIS — K21.9 GASTROESOPHAGEAL REFLUX DISEASE WITHOUT ESOPHAGITIS: ICD-10-CM

## 2025-03-27 DIAGNOSIS — Z12.11 COLON CANCER SCREENING: ICD-10-CM

## 2025-03-27 DIAGNOSIS — Z00.00 ENCOUNTER FOR ANNUAL PHYSICAL EXAM: Primary | ICD-10-CM

## 2025-03-27 DIAGNOSIS — J01.90 ACUTE NON-RECURRENT SINUSITIS, UNSPECIFIED LOCATION: ICD-10-CM

## 2025-03-27 DIAGNOSIS — Z00.00 ENCOUNTER FOR ANNUAL PHYSICAL EXAM: ICD-10-CM

## 2025-03-27 LAB
25(OH)D3 SERPL-MCNC: 8 NG/ML
ALBUMIN SERPL-MCNC: 4.4 G/DL (ref 3.5–5.2)
ALP SERPL-CCNC: 93 U/L (ref 40–129)
ALT SERPL-CCNC: 36 U/L (ref 10–50)
ANION GAP SERPL CALCULATED.3IONS-SCNC: 10 MMOL/L (ref 8–16)
AST SERPL-CCNC: 21 U/L (ref 10–50)
BASOPHILS # BLD: 0.1 K/UL (ref 0–0.2)
BASOPHILS NFR BLD: 0.6 % (ref 0–1)
BILIRUB SERPL-MCNC: 0.7 MG/DL (ref 0.2–1.2)
BUN SERPL-MCNC: 15 MG/DL (ref 6–20)
CALCIUM SERPL-MCNC: 8.8 MG/DL (ref 8.6–10)
CHLORIDE SERPL-SCNC: 106 MMOL/L (ref 98–107)
CHOLEST SERPL-MCNC: 182 MG/DL (ref 0–199)
CO2 SERPL-SCNC: 25 MMOL/L (ref 22–29)
CREAT SERPL-MCNC: 1 MG/DL (ref 0.7–1.2)
EOSINOPHIL # BLD: 0.2 K/UL (ref 0–0.6)
EOSINOPHIL NFR BLD: 1.7 % (ref 0–5)
ERYTHROCYTE [DISTWIDTH] IN BLOOD BY AUTOMATED COUNT: 12.5 % (ref 11.5–14.5)
GLUCOSE SERPL-MCNC: 116 MG/DL (ref 70–99)
HBA1C MFR BLD: 5.7 % (ref 4–5.6)
HCT VFR BLD AUTO: 43.5 % (ref 42–52)
HCV AB SERPL QL IA: NORMAL
HDLC SERPL-MCNC: 38 MG/DL (ref 40–60)
HGB BLD-MCNC: 14.4 G/DL (ref 14–18)
HIV-1 P24 AG: NORMAL
HIV1+2 AB SERPLBLD QL IA.RAPID: NORMAL
IMM GRANULOCYTES # BLD: 0.1 K/UL
LDLC SERPL CALC-MCNC: 127 MG/DL
LYMPHOCYTES # BLD: 2.3 K/UL (ref 1.1–4.5)
LYMPHOCYTES NFR BLD: 21.5 % (ref 20–40)
MCH RBC QN AUTO: 29.2 PG (ref 27–31)
MCHC RBC AUTO-ENTMCNC: 33.1 G/DL (ref 33–37)
MCV RBC AUTO: 88.2 FL (ref 80–94)
MONOCYTES # BLD: 0.6 K/UL (ref 0–0.9)
MONOCYTES NFR BLD: 5.8 % (ref 0–10)
NEUTROPHILS # BLD: 7.5 K/UL (ref 1.5–7.5)
NEUTS SEG NFR BLD: 69.8 % (ref 50–65)
PLATELET # BLD AUTO: 248 K/UL (ref 130–400)
PMV BLD AUTO: 10.3 FL (ref 9.4–12.4)
POTASSIUM SERPL-SCNC: 4 MMOL/L (ref 3.5–5.1)
PROT SERPL-MCNC: 7.1 G/DL (ref 6.4–8.3)
RBC # BLD AUTO: 4.93 M/UL (ref 4.7–6.1)
SODIUM SERPL-SCNC: 141 MMOL/L (ref 136–145)
TRIGL SERPL-MCNC: 86 MG/DL (ref 0–149)
TSH SERPL DL<=0.005 MIU/L-ACNC: 1.03 UIU/ML (ref 0.27–4.2)
WBC # BLD AUTO: 10.8 K/UL (ref 4.8–10.8)

## 2025-03-27 PROCEDURE — 99213 OFFICE O/P EST LOW 20 MIN: CPT | Performed by: NURSE PRACTITIONER

## 2025-03-27 PROCEDURE — 99396 PREV VISIT EST AGE 40-64: CPT | Performed by: NURSE PRACTITIONER

## 2025-03-27 RX ORDER — FLUTICASONE PROPIONATE 50 MCG
1 SPRAY, SUSPENSION (ML) NASAL DAILY
Qty: 32 G | Refills: 1 | Status: SHIPPED | OUTPATIENT
Start: 2025-03-27

## 2025-03-27 RX ORDER — DEXTROMETHORPHAN HYDROBROMIDE AND PROMETHAZINE HYDROCHLORIDE 15; 6.25 MG/5ML; MG/5ML
5 SYRUP ORAL 4 TIMES DAILY PRN
Qty: 118 ML | Refills: 0 | Status: SHIPPED | OUTPATIENT
Start: 2025-03-27 | End: 2025-04-03

## 2025-03-27 RX ORDER — PREDNISONE 20 MG/1
20 TABLET ORAL 2 TIMES DAILY
Qty: 10 TABLET | Refills: 0 | Status: SHIPPED | OUTPATIENT
Start: 2025-03-27 | End: 2025-04-01

## 2025-03-27 SDOH — ECONOMIC STABILITY: FOOD INSECURITY: WITHIN THE PAST 12 MONTHS, YOU WORRIED THAT YOUR FOOD WOULD RUN OUT BEFORE YOU GOT MONEY TO BUY MORE.: NEVER TRUE

## 2025-03-27 SDOH — ECONOMIC STABILITY: FOOD INSECURITY: WITHIN THE PAST 12 MONTHS, THE FOOD YOU BOUGHT JUST DIDN'T LAST AND YOU DIDN'T HAVE MONEY TO GET MORE.: NEVER TRUE

## 2025-03-27 ASSESSMENT — ENCOUNTER SYMPTOMS
COLOR CHANGE: 0
NAUSEA: 0
RHINORRHEA: 0
VOMITING: 0
PHOTOPHOBIA: 0
BACK PAIN: 0
SHORTNESS OF BREATH: 0
SINUS PRESSURE: 1
COUGH: 1
SINUS PAIN: 1
VOICE CHANGE: 0

## 2025-03-27 NOTE — RESULT ENCOUNTER NOTE
Please inform patient of vitamin d deficiency. I recommend 50,000 units once weekly. Please send to pharmacy.    Lipids are stable but borderline and I would recommend dietary changes and weight loss to prevent further worsening.

## 2025-03-27 NOTE — PATIENT INSTRUCTIONS
Cpap supplies.   Schedule colonoscopy.   Fasting labs in [address].   Follow-up annually for physical unless otherwise noted.

## 2025-03-27 NOTE — PROGRESS NOTES
Patient agreed with treatment plan. Follow up as directed.   MEDICATIONS:  Orders Placed This Encounter   Medications    predniSONE (DELTASONE) 20 MG tablet     Sig: Take 1 tablet by mouth 2 times daily for 5 days     Dispense:  10 tablet     Refill:  0    fluticasone (FLONASE) 50 MCG/ACT nasal spray     Si spray by Each Nostril route daily     Dispense:  32 g     Refill:  1    promethazine-dextromethorphan (PROMETHAZINE-DM) 6.25-15 MG/5ML syrup     Sig: Take 5 mLs by mouth 4 times daily as needed for Cough     Dispense:  118 mL     Refill:  0         ORDERS:  Orders Placed This Encounter   Procedures    Vitamin D 25 Hydroxy    Lipid Panel    Hemoglobin A1C    Comprehensive Metabolic Panel    TSH    CBC with Auto Differential    Hepatitis C Antibody    HIV Rapid 1&2    Asha Brody APRN, Gastroenterology, [place]       Follow-up:  Return in about 1 year (around 3/27/2026) for physical.    PATIENT INSTRUCTIONS:  Patient Instructions   Cpap supplies.   Schedule colonoscopy.   Fasting labs in [address].   Follow-up annually for physical unless otherwise noted.   Electronically signed by JULIETH Curiel on 3/27/2025 at 9:00 AM    EMR Dragon/transcription disclaimer:  Much of thisencounter note is electronic transcription/translation of spoken language to printed texts.  The electronic translation of spoken language may be erroneous, or at times, nonsensical words or phrases may be inadvertentlytranscribed.  Although I have reviewed the note for such errors, some may still exist.    The patient (or guardian, if applicable) and other individuals in attendance with the patient were advised that Artificial Intelligence will be utilized during this visit to record, process the conversation to generate a clinical note, and support improvement of the AI technology. The patient (or guardian, if applicable) and other individuals in attendance at the appointment consented to the use of AI, including the 
no blood in stool/no chills/no burning urination/no dysuria/no fever/no abdominal distension

## 2025-03-28 ENCOUNTER — TELEPHONE (OUTPATIENT)
Dept: PRIMARY CARE CLINIC | Age: 50
End: 2025-03-28

## 2025-03-28 NOTE — TELEPHONE ENCOUNTER
Patient came in wanting a print out letter saying he needs supplies from [employer] for his sleep apnea machine, patient was informed we did not have one and refused to accept that, patient was then spoken with by orlando who made the patient aware that marlene aranda is out of the office and we did not have a letter and would make marlene aranda aware on Monday when she is back in clinic.   We have not heard anything from [employer] at this time either who generally will fax over the request for what supplies are needed

## 2025-04-01 DIAGNOSIS — G47.33 OBSTRUCTIVE SLEEP APNEA: Primary | ICD-10-CM

## 2025-04-09 NOTE — TELEPHONE ENCOUNTER
Lasha Alfaro called to request a refill on his medication.      Last office visit : 3/27/2025   Next office visit : 3/28/2025     Requested Prescriptions     Pending Prescriptions Disp Refills    vitamin D (ERGOCALCIFEROL) 1.25 MG (97942 UT) CAPS capsule 12 capsule 1     Sig: Take 1 capsule by mouth once a week            Marjorie Morrison MA

## 2025-04-09 NOTE — TELEPHONE ENCOUNTER
----- Message from JULIETH Jackson sent at 3/27/2025 11:18 AM CDT -----  Please inform patient of vitamin d deficiency. I recommend 50,000 units once weekly. Please send to pharmacy.    Lipids are stable but borderline and I would recommend dietary changes and weight loss to prevent further worsening.

## 2025-04-10 ENCOUNTER — OFFICE VISIT (OUTPATIENT)
Dept: GASTROENTEROLOGY | Age: 50
End: 2025-04-10
Payer: COMMERCIAL

## 2025-04-10 VITALS
BODY MASS INDEX: 38.36 KG/M2 | SYSTOLIC BLOOD PRESSURE: 138 MMHG | DIASTOLIC BLOOD PRESSURE: 80 MMHG | HEIGHT: 76 IN | OXYGEN SATURATION: 97 % | HEART RATE: 77 BPM | WEIGHT: 315 LBS

## 2025-04-10 DIAGNOSIS — Z86.0100 HX OF COLONIC POLYPS: ICD-10-CM

## 2025-04-10 DIAGNOSIS — K21.9 CHRONIC GERD: Primary | ICD-10-CM

## 2025-04-10 PROCEDURE — 99214 OFFICE O/P EST MOD 30 MIN: CPT | Performed by: NURSE PRACTITIONER

## 2025-04-10 RX ORDER — ERGOCALCIFEROL 1.25 MG/1
50000 CAPSULE, LIQUID FILLED ORAL WEEKLY
COMMUNITY
End: 2025-04-10

## 2025-04-10 ASSESSMENT — ENCOUNTER SYMPTOMS
COUGH: 0
ABDOMINAL PAIN: 0
CONSTIPATION: 0
VOMITING: 0
DIARRHEA: 0
TROUBLE SWALLOWING: 0
ANAL BLEEDING: 0
ABDOMINAL DISTENTION: 0
CHOKING: 0
SHORTNESS OF BREATH: 0
RECTAL PAIN: 0
NAUSEA: 0
BLOOD IN STOOL: 0

## 2025-04-10 NOTE — PROGRESS NOTES
ear normal.      Nose: Nose normal.   Eyes:      General:         Right eye: No discharge.         Left eye: No discharge.      Extraocular Movements: Extraocular movements intact.      Conjunctiva/sclera: Conjunctivae normal.   Cardiovascular:      Rate and Rhythm: Normal rate.   Pulmonary:      Effort: Pulmonary effort is normal. No respiratory distress.   Abdominal:      General: There is no distension.   Musculoskeletal:         General: Normal range of motion.      Cervical back: Normal range of motion.   Skin:     General: Skin is warm and dry.   Neurological:      General: No focal deficit present.      Mental Status: He is alert and oriented to person, place, and time.   Psychiatric:         Mood and Affect: Mood normal.         Behavior: Behavior normal.         Physical Exam      The patient (or guardian, if applicable) and other individuals in attendance with the patient were advised that Artificial Intelligence will be utilized during this visit to record, process the conversation to generate a clinical note, and support improvement of the AI technology. The patient (or guardian, if applicable) and other individuals in attendance at the appointment consented to the use of AI, including the recording.

## 2025-04-14 RX ORDER — ERGOCALCIFEROL 1.25 MG/1
50000 CAPSULE, LIQUID FILLED ORAL WEEKLY
Qty: 12 CAPSULE | Refills: 1 | Status: SHIPPED | OUTPATIENT
Start: 2025-04-14

## 2025-04-18 ENCOUNTER — ANESTHESIA EVENT (OUTPATIENT)
Dept: OPERATING ROOM | Age: 50
End: 2025-04-18

## 2025-04-21 ENCOUNTER — APPOINTMENT (OUTPATIENT)
Dept: OPERATING ROOM | Age: 50
End: 2025-04-21
Attending: INTERNAL MEDICINE

## 2025-04-21 ENCOUNTER — HOSPITAL ENCOUNTER (OUTPATIENT)
Age: 50
Setting detail: OUTPATIENT SURGERY
Discharge: HOME | End: 2025-04-21
Attending: INTERNAL MEDICINE | Admitting: INTERNAL MEDICINE

## 2025-04-21 ENCOUNTER — ANESTHESIA (OUTPATIENT)
Dept: OPERATING ROOM | Age: 50
End: 2025-04-21

## 2025-04-21 ENCOUNTER — HOSPITAL ENCOUNTER (OUTPATIENT)
Age: 50
Setting detail: SPECIMEN
Discharge: HOME | End: 2025-04-21
Payer: COMMERCIAL

## 2025-04-21 VITALS
SYSTOLIC BLOOD PRESSURE: 120 MMHG | TEMPERATURE: 97.5 F | HEART RATE: 67 BPM | OXYGEN SATURATION: 97 % | HEIGHT: 76 IN | RESPIRATION RATE: 18 BRPM | WEIGHT: 315 LBS | BODY MASS INDEX: 38.36 KG/M2 | DIASTOLIC BLOOD PRESSURE: 75 MMHG

## 2025-04-21 PROCEDURE — 88342 IMHCHEM/IMCYTCHM 1ST ANTB: CPT

## 2025-04-21 PROCEDURE — G8918 PT W/O PREOP ORDER IV AB PRO: HCPCS

## 2025-04-21 PROCEDURE — 45380 COLONOSCOPY AND BIOPSY: CPT

## 2025-04-21 PROCEDURE — 88305 TISSUE EXAM BY PATHOLOGIST: CPT

## 2025-04-21 PROCEDURE — 45385 COLONOSCOPY W/LESION REMOVAL: CPT

## 2025-04-21 PROCEDURE — 43239 EGD BIOPSY SINGLE/MULTIPLE: CPT

## 2025-04-21 PROCEDURE — G8907 PT DOC NO EVENTS ON DISCHARG: HCPCS

## 2025-04-21 RX ORDER — PROPOFOL 10 MG/ML
INJECTION, EMULSION INTRAVENOUS
Status: DISCONTINUED | OUTPATIENT
Start: 2025-04-21 | End: 2025-04-21 | Stop reason: SDUPTHER

## 2025-04-21 RX ORDER — SODIUM CHLORIDE, SODIUM LACTATE, POTASSIUM CHLORIDE, CALCIUM CHLORIDE 600; 310; 30; 20 MG/100ML; MG/100ML; MG/100ML; MG/100ML
INJECTION, SOLUTION INTRAVENOUS CONTINUOUS
Status: DISCONTINUED | OUTPATIENT
Start: 2025-04-21 | End: 2025-04-21 | Stop reason: HOSPADM

## 2025-04-21 RX ORDER — LIDOCAINE HYDROCHLORIDE 10 MG/ML
INJECTION, SOLUTION EPIDURAL; INFILTRATION; INTRACAUDAL; PERINEURAL
Status: DISCONTINUED | OUTPATIENT
Start: 2025-04-21 | End: 2025-04-21 | Stop reason: SDUPTHER

## 2025-04-21 RX ADMIN — PROPOFOL 450 MG: 10 INJECTION, EMULSION INTRAVENOUS at 10:08

## 2025-04-21 RX ADMIN — LIDOCAINE HYDROCHLORIDE 50 MG: 10 INJECTION, SOLUTION EPIDURAL; INFILTRATION; INTRACAUDAL; PERINEURAL at 10:08

## 2025-04-21 RX ADMIN — SODIUM CHLORIDE, SODIUM LACTATE, POTASSIUM CHLORIDE, CALCIUM CHLORIDE: 600; 310; 30; 20 INJECTION, SOLUTION INTRAVENOUS at 09:56

## 2025-04-21 ASSESSMENT — PAIN - FUNCTIONAL ASSESSMENT: PAIN_FUNCTIONAL_ASSESSMENT: 0-10

## 2025-04-21 NOTE — OP NOTE
Patient: Lasha Alfaro : 1975  [facility] Rec#: [id number] Acc#: [id number]   Primary Care Provider Lydia Swain APRN    Date of Procedure:  2025    Endoscopist: Oniel Mosqueda MD    Referring Provider: Lydia Swain APRN, JULIETH Green    Operation Performed:     Colonoscopy with snare polypectomy  Colonoscopy with forceps polypectomy    Indications: Screening- personal hx of colon polyps    Anesthesia:  Sedation was administered by anesthesia who monitored the patient during the procedure.    I met with Lasha Alfaro prior to procedure. We discussed the procedure itself, and I have discussed the risks of endoscopy (including-- but not limited to-- pain, discomfort, bleeding potentially requiring second endoscopic procedure and/or blood transfusion, organ perforation requiring operative repair, damage to organs near the colon, infection, aspiration, cardiopulmonary/allergic reaction), benefits, indications to endoscopy. Additionally, we discussed options other than colonoscopy. The patient expressed understanding. All questions answered. The patient decided to proceed with the procedure.  Signed informed consent was placed on the chart.    Blood Loss: minimal    Withdrawal time: > 6 min  Bowel Prep: adequate     Complications: no immediate complications    DESCRIPTION OF PROCEDURE:     A time out was performed. After written informed consent was obtained, the patient was placed in the left lateral position.     The perianal area was inspected, and a digital rectal exam was performed. A rectal exam was performed: normal tone, no palpable lesions. At this point, a forward viewing Olympus colonoscope was inserted into the anus and carefully advanced to the cecum.  The cecum was identified by the ileocecal valve and the appendiceal orifice. The colonoscope was then slowly withdrawn with careful inspection of the mucosa in a linear and circumferential fashion. The scope was retroflexed in the

## 2025-04-21 NOTE — ANESTHESIA POSTPROCEDURE EVALUATION
Department of Anesthesiology  Postprocedure Note    Patient: Lasha Alfaro  MRN: [id number]  YOB: 1975  Date of evaluation: 4/21/2025    Procedure Summary       Date: 04/21/25 Room / Location: [place] [address] / [facility]    Anesthesia Start: 1000 Anesthesia Stop:     Procedures:       ESOPHAGOGASTRODUODENOSCOPY BIOPSY (Esophagus)      COLORECTAL CANCER SCREENING, NOT HIGH RISK (Abdomen)      COLONOSCOPY POLYPECTOMY  HOT SNARE/BIOPSY (Abdomen) Diagnosis:       Screen for colon cancer      Hx of colonic polyps      (Screen for colon cancer [Z12.11])      (Hx of colonic polyps [Z86.0100])    Surgeons: Oniel Mosqueda MD Responsible Provider: Alma Rosa Tejeda APRN - CRNA    Anesthesia Type: general, TIVA ASA Status: 2            Anesthesia Type: No value filed.    Nas Phase I:      Nas Phase II:      Anesthesia Post Evaluation    Patient location during evaluation: bedside  Patient participation: complete - patient participated  Level of consciousness: responsive to physical stimuli  Pain score: 0  Airway patency: patent  Nausea & Vomiting: no nausea and no vomiting  Cardiovascular status: blood pressure returned to baseline  Respiratory status: acceptable, room air and spontaneous ventilation  Hydration status: euvolemic  Pain management: adequate    No notable events documented.

## 2025-04-21 NOTE — DISCHARGE INSTRUCTIONS
EGD RECOMMENDATIONS:    1.  Await path results- repeat EGD in 3 years if histology indicative of Yap's  2.  Continue prilosec  3.  Minimize/avoid NSAID use  4.  Follow up OV with JULIETH Asher in 2-3 months.     Colonoscopy Recommendations:  1. Repeat colonoscopy: pending pathology - 3 years, due to number and size of polyps removed.   2. Await biopsy results.    POST-OP ORDERS: ENDOSCOPY & COLONOSCOPY:    1. Rest today.    2. DO NOT eat or drink until wide awake; eat your usual diet today in moderate amount only.    3. DO NOT drive today.    4. Call physician if you have severe pain, vomiting, fever, rectal bleeding or black bowel movements.    5.  If a biopsy was taken or a polyp removed, you should expect to hear results in about 7-10 days.  If you have heard nothing from your physician by then, call the office for results.    6.  Discharge home when patient awake, vitals signs stable and tolerating liquids.    7. Call with questions or concerns [phone].    NSAIDS (Non-steroidal Anti-Inflammatory)    You have been directed by your physician to avoid any NSAID's; the following medications are a list of those to avoid. If you think that you are taking any NSAID's notify your physician.     Over The Counter    Advil                      Motrin  Nuprin                   Ibuprofen  Midol                     Aleve  Naproxen              Orudis  Aspirin                   [place]    Prescriptions and Generics    Cataflam              Relafen  Voltaren               Clinoril  Indocin                 Naproxen  Arthrotec              Lodine  Daypro                 Nalfon  Toradol                Ansaid  Feldene               Meclofenamate  Fenoprofen          Ponstel  Mobic                   Celebrex  Vioxx

## 2025-04-21 NOTE — ANESTHESIA PRE PROCEDURE
Department of Anesthesiology  Preprocedure Note       Name:  Lasha Alfaro   Age:  49 y.o.  :  1975                                          MRN:  [id number]         Date:  2025      Surgeon: Surgeon(s):  Oniel Mosqueda MD    Procedure: Procedure(s):  ESOPHAGOGASTRODUODENOSCOPY BIOPSY  COLORECTAL CANCER SCREENING, NOT HIGH RISK    Medications prior to admission:   Prior to Admission medications    Medication Sig Start Date End Date Taking? Authorizing Provider   vitamin D (ERGOCALCIFEROL) 1.25 MG (66434 UT) CAPS capsule Take 1 capsule by mouth once a week 25   Lydia Swain APRN   fluticasone (FLONASE) 50 MCG/ACT nasal spray 1 spray by Each Nostril route daily 3/27/25   Lydia Swain APRN   omeprazole (PRILOSEC) 40 MG delayed release capsule TAKE ONE CAPSULE BY MOUTH EVERY MORNING ON AN EMPTY STOMACH 22   Lydia Swain APRN       Current medications:    Current Facility-Administered Medications   Medication Dose Route Frequency Provider Last Rate Last Admin   • lactated ringers infusion   IntraVENous Continuous Oniel Mosqueda MD           Allergies:    Allergies   Allergen Reactions   • Bee Venom    • Other      Tree nuts       Problem List:    Patient Active Problem List   Diagnosis Code   • Gastroesophageal reflux disease without esophagitis K21.9   • Obstructive sleep apnea G47.33       Past Medical History:        Diagnosis Date   • Esophageal erosions    • Gastritis    • GERD (gastroesophageal reflux disease)     with Esophagitis   • Irritable bowel syndrome with diarrhea        Past Surgical History:        Procedure Laterality Date   • CHOLECYSTECTOMY  2013   • COLONOSCOPY  2016    [facility]-  AP, HP   • COLONOSCOPY N/A 12/10/2020    Dr DIONISIO Mosqueda-AP x 1, HP x 2, 3 yr recall   • KNEE SURGERY Right    • PILONIDAL CYST DRAINAGE     • UPPER GASTROINTESTINAL ENDOSCOPY  2016    [facility]: Erosion w/o bleeding, LA Grade A reflux   •

## 2025-04-21 NOTE — OP NOTE
Endoscopic Procedure Note    Patient: Lasha Alfaro : 1975  Med Rec#: [id number] Acc#: [id number]     Primary Care Provider Lydia Swain APRN  Referring Provider: JULIETH Green    Endoscopist: Oniel Mosqueda MD    Date of Procedure:  2025    Procedure:   1. EGD with biopsy    Indications:   1. Reflux   2. Abdominal pain    Anesthesia:  Sedation was administered by anesthesia who monitored the patient during the procedure.    Estimated Blood Loss: minimal    Procedure:   After reviewing the patient's chart and obtaining informed consent, the patient was placed in the left lateral decubitus position.  A forward-viewing Olympus endoscope was lubricated and inserted through the mouth into the oropharynx. Under direct visualization, the upper esophagus was intubated. The scope was advanced to the level of the third portion of duodenum. Scope was slowly withdrawn with careful inspection of the mucosal surfaces. The scope was retroflexed for inspection of the gastric fundus and incisura. Findings and maneuvers are listed in impression below. The patient tolerated the procedure well. The scope was removed. There were no immediate complications.    Findings:   Esophagus: abnormal: there are mucosal changes of mild esophagitis noted in the distal esophagus- biopsied.     There is no hiatal hernia present.      Stomach:  Abnormal: Mild mucosal changes suggestive of gastritis noted -  Gastric biopsies were taken from the antrum and body to rule out Helicobacter pylori infection.      Duodenum: normal      IMPRESSION:  1. Gastritis- biopsied   2. Esophagitis- biopsied      RECOMMENDATIONS:    1.  Await path results- repeat EGD in 3 years if histology indicative of Yap's  2.  Continue prilosec  3.  Minimize/avoid NSAID use  4.  Follow up OV with JULIETH Asher in 2-3 months.     The results were discussed with the patient and family.  A copy of the images obtained were given to the patient.

## 2025-04-21 NOTE — H&P
Patient Name: Lasha Alfaro  : 1975  MRN: [id number]  DATE: 25    Allergies:   Allergies   Allergen Reactions    Bee Venom     Other      Tree nuts        ENDOSCOPY  History and Physical    Procedure:    [] Diagnostic Colonoscopy       [x] Screening Colonoscopy  [x] EGD      [] ERCP      [] EUS       [] Other    [x] Previous office notes/History and Physical reviewed from the patients chart. Please see EMR for further details of HPI. I have examined the patient's status immediately prior to the procedure and:      Indications/HPI:    []Abdominal Pain   []Barretts  [x]Screening/Surveillance   []History of Polyps  []Dysphagia            [] +Cologard/DNA testing  []Abnormal Imaging              []EOE Hx              [] Family Hx of CRC/Polyps  []Anemia                            []Food Impaction       []Recent Poor Prep  []GI Bleed             []Lymphadenopathy  [x]History of Polyps  []Change in bowel habits [x]Heartburn/Reflux  []Cancer- GI/Lung  []Chest Pain - Non Cardiac []Heme (+) Stool []Ulcers  []Constipation  []Hemoptysis  []Incontinence    []Diarrhea  []Hypoxemia  []Rectal Bleed (BRBPR)  []Nausea/Vomiting   [] Varices  []Crohns/Colitis  []Pancreatic Cyst   [] Cirrhosis   []Pancreatitis    []Abnormal MRCP  []Elevated LFT [] Stent Removal, Previous ERCP  []Other:     Anesthesia:   [x] MAC [] Moderate Sedation   [] General   [] None     ROS: 12 pt Review of Symptoms was negative unless mentioned above    Medications:   Prior to Admission medications    Medication Sig Start Date End Date Taking? Authorizing Provider   vitamin D (ERGOCALCIFEROL) 1.25 MG (03253 UT) CAPS capsule Take 1 capsule by mouth once a week 25   Lydia Swain APRN   fluticasone (FLONASE) 50 MCG/ACT nasal spray 1 spray by Each Nostril route daily 3/27/25   Lydia Swain APRN   omeprazole (PRILOSEC) 40 MG delayed release capsule TAKE ONE CAPSULE BY MOUTH EVERY MORNING ON AN EMPTY STOMACH 22   Lydia Swain

## 2025-04-23 ENCOUNTER — RESULTS FOLLOW-UP (OUTPATIENT)
Dept: GASTROENTEROLOGY | Age: 50
End: 2025-04-23

## 2025-06-23 ENCOUNTER — OFFICE VISIT (OUTPATIENT)
Dept: GASTROENTEROLOGY | Age: 50
End: 2025-06-23
Payer: COMMERCIAL

## 2025-06-23 VITALS
WEIGHT: 315 LBS | HEART RATE: 74 BPM | SYSTOLIC BLOOD PRESSURE: 135 MMHG | OXYGEN SATURATION: 97 % | HEIGHT: 76 IN | BODY MASS INDEX: 38.36 KG/M2 | DIASTOLIC BLOOD PRESSURE: 83 MMHG

## 2025-06-23 DIAGNOSIS — K21.9 CHRONIC GERD: Primary | ICD-10-CM

## 2025-06-23 DIAGNOSIS — Z86.0100 HX OF COLONIC POLYPS: ICD-10-CM

## 2025-06-23 PROCEDURE — 99213 OFFICE O/P EST LOW 20 MIN: CPT | Performed by: NURSE PRACTITIONER

## 2025-06-23 ASSESSMENT — ENCOUNTER SYMPTOMS
TROUBLE SWALLOWING: 0
BLOOD IN STOOL: 0
VOMITING: 0
ABDOMINAL DISTENTION: 0
ANAL BLEEDING: 0
NAUSEA: 0
DIARRHEA: 1
CHOKING: 0
ABDOMINAL PAIN: 0
RECTAL PAIN: 0
COUGH: 0
SHORTNESS OF BREATH: 0
CONSTIPATION: 0

## 2025-06-23 NOTE — PROGRESS NOTES
Subjective:     Patient ID: Lasha Alfaro is a 49 y.o. male  PCP: Lydia Swain APRN  Referring Provider: No ref. provider found    HPI  Patient presents to the office today with the following complaints: Follow-up      History of Present Illness  The patient presents for evaluation of reflux and bowel irregularities.    He reports that his reflux symptoms are well-managed with daily Prilosec.    His bowel movements, however, have not returned to their normal state following gallbladder surgery. Despite adhering to dietary recommendations, he experiences inconsistent bowel movements, ranging from once to three times daily, often accompanied by urgency. He has attempted to manage this with Questran, but it has not been effective. He has also made modifications to his eating habits, such as skipping breakfast and limiting his food intake during travel.    SOCIAL HISTORY  Diet: Does not eat breakfast anymore, eats after arriving at destination, and sits after eating.    FAMILY HISTORY  - Family history of cancer, but not colon cancer.      Last EGD 4/21/2025 - Esophagitis, gastritis, no HH, no Yap's   Last Colonoscopy 4/211/2025 - AP x 1, HP x 2, 3 yr recall   Family hx colon cancer/colon polyps - Denies     Results  Diagnostic Testing   - Endoscopy: Irritation in the esophagus. Biopsies from esophagus were normal.   - Colonoscopy: A couple of polyps which were removed and found to be non-cancerous.    Assessment:     1. Chronic GERD    2. Hx of colonic polyps              Plan:     Assessment & Plan  1. Reflux: Stable.  - Continue Prilosec once daily.    2. Bowel irregularities.  - Consider Colestipol (Colestid) if symptoms worsen or become more bothersome.  - Maintain current dietary modifications to manage symptoms.    3. Colon polyps.  - Repeat colonoscopy in 3 years.    Follow-up  - 06/2028      Orders  No orders of the defined types were placed in this encounter.    Medications  No orders of the defined

## (undated) DEVICE — ENDO KIT,LOURDES HOSPITAL: Brand: MEDLINE INDUSTRIES, INC.

## (undated) DEVICE — SINGLE PORT MANIFOLD: Brand: NEPTUNE 2

## (undated) DEVICE — BRUSH ENDOSCP 2 END CHN HEDGEHOG

## (undated) DEVICE — BITE BLOCK ENDOSCP AD 60 FR W/ ADJ STRP PLAS GRN BLOX

## (undated) DEVICE — SNARE ENDOSCP L240CM OD2.4MM LOOP W15MM RND INSUL STIFF

## (undated) DEVICE — COLON KIT WITH 1.1 OZ ORCA HYDRA SEAL 2 GOWN

## (undated) DEVICE — FORCEPS BX 240CM 2.4MM L NDL RAD JAW 4 M00513334

## (undated) DEVICE — CLEANING SPONGE: Brand: KOALA™

## (undated) DEVICE — SUPPLEMENT DIGESTIVE H2O SOL GI-EASE

## (undated) DEVICE — CANNULA NSL AD L7FT DIV O2 CO2 W/ M LUERLOCK TRMPT CONN

## (undated) DEVICE — ADAPTER CLEANING PORPOISE CLEANING